# Patient Record
Sex: FEMALE | NOT HISPANIC OR LATINO | Employment: OTHER | URBAN - METROPOLITAN AREA
[De-identification: names, ages, dates, MRNs, and addresses within clinical notes are randomized per-mention and may not be internally consistent; named-entity substitution may affect disease eponyms.]

---

## 2017-01-25 ENCOUNTER — OFFICE VISIT (OUTPATIENT)
Dept: FAMILY MEDICINE CLINIC | Facility: CLINIC | Age: 67
End: 2017-01-25

## 2017-01-25 VITALS
BODY MASS INDEX: 28.53 KG/M2 | HEIGHT: 63 IN | HEART RATE: 91 BPM | OXYGEN SATURATION: 96 % | DIASTOLIC BLOOD PRESSURE: 70 MMHG | SYSTOLIC BLOOD PRESSURE: 120 MMHG | TEMPERATURE: 98.6 F | WEIGHT: 161 LBS

## 2017-01-25 DIAGNOSIS — R22.32 AXILLARY LUMP, LEFT: Primary | ICD-10-CM

## 2017-01-25 PROCEDURE — 99213 OFFICE O/P EST LOW 20 MIN: CPT | Performed by: NURSE PRACTITIONER

## 2017-01-25 RX ORDER — ESZOPICLONE 3 MG/1
TABLET, FILM COATED ORAL
Refills: 2 | COMMUNITY
Start: 2017-01-11 | End: 2019-01-17

## 2017-01-25 NOTE — MR AVS SNAPSHOT
Shell Sanford   2017 10:30 AM   Office Visit    Provider:  ORTEGA Rajput   Department:  [facility]   Dept Phone:  [phone]                Your Full Care Plan              Your Updated Medication List          This list is accurate as of: 17 11:16 AM.  Always use your most recent med list.                ALPRAZolam 0.5 MG tablet   Commonly known as:  XANAX       ARIPiprazole 2 MG tablet   Commonly known as:  ABILIFY       atorvastatin 20 MG tablet   Commonly known as:  LIPITOR   Take 1 tablet by mouth Daily.       diclofenac 1 % gel gel   Commonly known as:  VOLTAREN   Apply 4 g topically 4 (four) times a day.       eszopiclone 3 MG tablet   Commonly known as:  LUNESTA       fenofibrate micronized 130 MG capsule   Commonly known as:  ANTARA   Take 1 capsule by mouth Every Morning Before Breakfast.       lamoTRIgine 200 MG tablet   Commonly known as:  LaMICtal       levothyroxine 100 MCG tablet   Commonly known as:  SYNTHROID, LEVOTHROID   Take 1 tablet by mouth Daily.       valsartan-hydrochlorothiazide 320-25 MG per tablet   Commonly known as:  DIOVAN-HCT   Take 1 tablet by mouth Daily.       venlafaxine XR 75 MG 24 hr capsule   Commonly known as:  EFFEXOR-XR       zolpidem CR 12.5 MG CR tablet   Commonly known as:  AMBIEN CR               Instructions     None    Patient Instructions History      [employer] Signup     [facility] [employer] allows you to send messages to your doctor, view your test results, renew your prescriptions, schedule appointments, and more. To sign up, go to [employer] and click on the Sign Up Now link in the New User? box. Enter your [employer] Activation Code exactly as it appears below along with the last four digits of your Social Security Number and your Date of Birth () to complete the sign-up process. If you do not sign up before the expiration date, you must request a new  "code.    [email or web address] Activation Code: [email or web address]  Expires: 2/8/2017 11:16 AM    If you have questions, you can email [email or web address] or call [phone] to talk to our [employer] staff. Remember, [employer] is NOT to be used for urgent needs. For medical emergencies, dial 911.               Other Info from Your Visit           Allergies     Keflet [Cephalexin]      Neosporin [Neomycin-polymyxin-gramicidin]      Penicillins        Reason for Visit     breast exam had a mastectomy      Vital Signs     Blood Pressure Pulse Temperature Height Weight Oxygen Saturation    120/70 91 98.6 °F (37 °C) 63\" (160 cm) 161 lb (73 kg) 96%    Body Mass Index Smoking Status                28.52 kg/m2 Current Some Day Smoker          "

## 2017-01-25 NOTE — PROGRESS NOTES
"Subjective   Shell Sanford is a 66 y.o. female.     History of Present Illness   Patient presents to office with reported swelling to left axillary area for several days. Denies tenderness or hard mass.  Describes it more as \"puffiness.\" Has history of breast cancer and had double mastectomy 5 years ago.  She has not had imaging done since. She reports she still has her axillary lymph nodes.  She contacted Dr. Ram's office and was instructed to f/u with her PCP first.    The following portions of the patient's history were reviewed and updated as appropriate: allergies, current medications, past family history, past medical history, past social history, past surgical history and problem list.    Review of Systems   Skin: Negative for color change and wound.       Objective   Physical Exam   Constitutional: She is oriented to person, place, and time. She appears well-developed and well-nourished.   HENT:   Head: Normocephalic and atraumatic.   Pulmonary/Chest: Effort normal.       Patient has 3 cm superficial fluctuant area to left axilla.  No tenderness elicited with palpation.  No lymphadenopathy noted.  No erythema.    Neurological: She is alert and oriented to person, place, and time.   Skin: Skin is warm and dry.   Psychiatric: She has a normal mood and affect. Judgment normal.   Vitals reviewed.      Assessment/Plan   Shell was seen today for breast exam.    Diagnoses and all orders for this visit:    Axillary lump, left  -     US Axilla Left      Question seroma.          "

## 2017-02-14 ENCOUNTER — HOSPITAL ENCOUNTER (OUTPATIENT)
Dept: ULTRASOUND IMAGING | Facility: HOSPITAL | Age: 67
Discharge: HOME | End: 2017-02-14
Admitting: NURSE PRACTITIONER

## 2017-02-14 PROCEDURE — 76882 US LMTD JT/FCL EVL NVASC XTR: CPT

## 2017-02-15 ENCOUNTER — TELEPHONE (OUTPATIENT)
Dept: FAMILY MEDICINE CLINIC | Facility: CLINIC | Age: 67
End: 2017-02-15

## 2017-02-15 NOTE — TELEPHONE ENCOUNTER
----- Message from ORTEGA Rajput sent at 2/14/2017  5:00 PM EST -----  Ultrasound was negative. No abnormality noted.

## 2017-02-23 DIAGNOSIS — N39.0 URINARY TRACT INFECTION, SITE UNSPECIFIED: Primary | ICD-10-CM

## 2017-02-23 RX ORDER — SULFAMETHOXAZOLE AND TRIMETHOPRIM 800; 160 MG/1; MG/1
1 TABLET ORAL 2 TIMES DAILY
Qty: 14 TABLET | Refills: 0 | Status: SHIPPED | OUTPATIENT
Start: 2017-02-23 | End: 2017-05-23

## 2017-04-15 DIAGNOSIS — E78.2 MIXED HYPERLIPIDEMIA: ICD-10-CM

## 2017-04-17 RX ORDER — ATORVASTATIN CALCIUM 20 MG/1
TABLET, FILM COATED ORAL
Qty: 30 TABLET | Refills: 0 | Status: SHIPPED | OUTPATIENT
Start: 2017-04-17 | End: 2017-05-14 | Stop reason: SDUPTHER

## 2017-05-14 DIAGNOSIS — I10 ESSENTIAL HYPERTENSION: ICD-10-CM

## 2017-05-14 DIAGNOSIS — E78.2 MIXED HYPERLIPIDEMIA: ICD-10-CM

## 2017-05-15 DIAGNOSIS — E03.9 ACQUIRED HYPOTHYROIDISM: ICD-10-CM

## 2017-05-15 DIAGNOSIS — E78.2 MIXED HYPERLIPIDEMIA: ICD-10-CM

## 2017-05-15 RX ORDER — ATORVASTATIN CALCIUM 20 MG/1
TABLET, FILM COATED ORAL
Qty: 30 TABLET | Refills: 0 | Status: SHIPPED | OUTPATIENT
Start: 2017-05-15 | End: 2017-05-23 | Stop reason: SDUPTHER

## 2017-05-15 RX ORDER — LEVOTHYROXINE SODIUM 100 MCG
TABLET ORAL
Qty: 30 TABLET | Refills: 5 | OUTPATIENT
Start: 2017-05-15

## 2017-05-15 RX ORDER — FENOFIBRATE 130 MG/1
CAPSULE ORAL
Qty: 30 CAPSULE | Refills: 5 | OUTPATIENT
Start: 2017-05-15

## 2017-05-15 RX ORDER — VALSARTAN AND HYDROCHLOROTHIAZIDE 320; 25 MG/1; MG/1
TABLET, FILM COATED ORAL
Qty: 30 TABLET | Refills: 5 | Status: SHIPPED | OUTPATIENT
Start: 2017-05-15 | End: 2017-05-23 | Stop reason: SDUPTHER

## 2017-05-23 ENCOUNTER — OFFICE VISIT (OUTPATIENT)
Dept: FAMILY MEDICINE CLINIC | Facility: CLINIC | Age: 67
End: 2017-05-23

## 2017-05-23 VITALS
HEART RATE: 84 BPM | HEIGHT: 63 IN | SYSTOLIC BLOOD PRESSURE: 141 MMHG | RESPIRATION RATE: 16 BRPM | TEMPERATURE: 98.5 F | BODY MASS INDEX: 28.35 KG/M2 | DIASTOLIC BLOOD PRESSURE: 78 MMHG | WEIGHT: 160 LBS

## 2017-05-23 DIAGNOSIS — I10 ESSENTIAL HYPERTENSION: ICD-10-CM

## 2017-05-23 DIAGNOSIS — E78.2 MIXED HYPERLIPIDEMIA: ICD-10-CM

## 2017-05-23 DIAGNOSIS — Z00.00 ANNUAL PHYSICAL EXAM: Primary | ICD-10-CM

## 2017-05-23 DIAGNOSIS — E03.9 ACQUIRED HYPOTHYROIDISM: ICD-10-CM

## 2017-05-23 LAB
ALBUMIN SERPL-MCNC: 4.7 G/DL (ref 3.5–5.2)
ALBUMIN/GLOB SERPL: 1.7 G/DL
ALP SERPL-CCNC: 75 U/L (ref 39–117)
ALT SERPL-CCNC: 26 U/L (ref 1–33)
AST SERPL-CCNC: 26 U/L (ref 1–32)
BASOPHILS # BLD AUTO: 0.03 10*3/MM3 (ref 0–0.2)
BASOPHILS NFR BLD AUTO: 0.4 % (ref 0–1.5)
BILIRUB SERPL-MCNC: 0.3 MG/DL (ref 0.1–1.2)
BUN SERPL-MCNC: 20 MG/DL (ref 8–23)
BUN/CREAT SERPL: 15.3 (ref 7–25)
CALCIUM SERPL-MCNC: 9.9 MG/DL (ref 8.6–10.5)
CHLORIDE SERPL-SCNC: 96 MMOL/L (ref 98–107)
CHOLEST SERPL-MCNC: 198 MG/DL (ref 0–200)
CO2 SERPL-SCNC: 27.9 MMOL/L (ref 22–29)
CREAT SERPL-MCNC: 1.31 MG/DL (ref 0.57–1)
EOSINOPHIL # BLD AUTO: 0.19 10*3/MM3 (ref 0–0.7)
EOSINOPHIL NFR BLD AUTO: 2.4 % (ref 0.3–6.2)
ERYTHROCYTE [DISTWIDTH] IN BLOOD BY AUTOMATED COUNT: 13.4 % (ref 11.7–13)
GLOBULIN SER CALC-MCNC: 2.8 GM/DL
GLUCOSE SERPL-MCNC: 97 MG/DL (ref 65–99)
HCT VFR BLD AUTO: 37.4 % (ref 35.6–45.5)
HDLC SERPL-MCNC: 61 MG/DL (ref 40–60)
HGB BLD-MCNC: 12 G/DL (ref 11.9–15.5)
IMM GRANULOCYTES # BLD: 0.02 10*3/MM3 (ref 0–0.03)
IMM GRANULOCYTES NFR BLD: 0.3 % (ref 0–0.5)
LDLC SERPL CALC-MCNC: 104 MG/DL (ref 0–100)
LYMPHOCYTES # BLD AUTO: 2.99 10*3/MM3 (ref 0.9–4.8)
LYMPHOCYTES NFR BLD AUTO: 38 % (ref 19.6–45.3)
MCH RBC QN AUTO: 29.3 PG (ref 26.9–32)
MCHC RBC AUTO-ENTMCNC: 32.1 G/DL (ref 32.4–36.3)
MCV RBC AUTO: 91.2 FL (ref 80.5–98.2)
MONOCYTES # BLD AUTO: 0.56 10*3/MM3 (ref 0.2–1.2)
MONOCYTES NFR BLD AUTO: 7.1 % (ref 5–12)
NEUTROPHILS # BLD AUTO: 4.08 10*3/MM3 (ref 1.9–8.1)
NEUTROPHILS NFR BLD AUTO: 51.8 % (ref 42.7–76)
PLATELET # BLD AUTO: 371 10*3/MM3 (ref 140–500)
POTASSIUM SERPL-SCNC: 4.3 MMOL/L (ref 3.5–5.2)
PROT SERPL-MCNC: 7.5 G/DL (ref 6–8.5)
RBC # BLD AUTO: 4.1 10*6/MM3 (ref 3.9–5.2)
SODIUM SERPL-SCNC: 138 MMOL/L (ref 136–145)
T4 FREE SERPL-MCNC: 1.61 NG/DL (ref 0.93–1.7)
TRIGL SERPL-MCNC: 164 MG/DL (ref 0–150)
TSH SERPL DL<=0.005 MIU/L-ACNC: 0.16 MIU/ML (ref 0.27–4.2)
VLDLC SERPL CALC-MCNC: 32.8 MG/DL (ref 5–40)
WBC # BLD AUTO: 7.87 10*3/MM3 (ref 4.5–10.7)

## 2017-05-23 PROCEDURE — G0438 PPPS, INITIAL VISIT: HCPCS | Performed by: FAMILY MEDICINE

## 2017-05-23 PROCEDURE — 99214 OFFICE O/P EST MOD 30 MIN: CPT | Performed by: FAMILY MEDICINE

## 2017-05-23 RX ORDER — LEVOTHYROXINE SODIUM 0.1 MG/1
100 TABLET ORAL DAILY
Qty: 30 TABLET | Refills: 5 | Status: SHIPPED | OUTPATIENT
Start: 2017-05-23 | End: 2017-11-16 | Stop reason: SDUPTHER

## 2017-05-23 RX ORDER — VALSARTAN AND HYDROCHLOROTHIAZIDE 320; 25 MG/1; MG/1
1 TABLET, FILM COATED ORAL DAILY
Qty: 30 TABLET | Refills: 5 | Status: SHIPPED | OUTPATIENT
Start: 2017-05-23 | End: 2017-12-22 | Stop reason: SDUPTHER

## 2017-05-23 RX ORDER — FENOFIBRATE 130 MG/1
130 CAPSULE ORAL
Qty: 30 CAPSULE | Refills: 5 | Status: SHIPPED | OUTPATIENT
Start: 2017-05-23 | End: 2017-11-16 | Stop reason: SDUPTHER

## 2017-05-23 RX ORDER — ATORVASTATIN CALCIUM 20 MG/1
20 TABLET, FILM COATED ORAL DAILY
Qty: 30 TABLET | Refills: 5 | Status: SHIPPED | OUTPATIENT
Start: 2017-05-23 | End: 2017-12-28 | Stop reason: SDUPTHER

## 2017-09-20 ENCOUNTER — PROCEDURE VISIT (OUTPATIENT)
Dept: FAMILY MEDICINE CLINIC | Facility: CLINIC | Age: 67
End: 2017-09-20

## 2017-09-20 VITALS
BODY MASS INDEX: 28.17 KG/M2 | SYSTOLIC BLOOD PRESSURE: 131 MMHG | DIASTOLIC BLOOD PRESSURE: 83 MMHG | HEART RATE: 93 BPM | HEIGHT: 63 IN | WEIGHT: 159 LBS | TEMPERATURE: 97.9 F | OXYGEN SATURATION: 97 % | RESPIRATION RATE: 18 BRPM

## 2017-09-20 DIAGNOSIS — Z12.72 VAGINAL PAP SMEAR: Primary | ICD-10-CM

## 2017-09-20 DIAGNOSIS — R35.0 URINARY FREQUENCY: ICD-10-CM

## 2017-09-20 LAB
BILIRUB BLD-MCNC: NEGATIVE MG/DL
CLARITY, POC: CLEAR
COLOR UR: YELLOW
GLUCOSE UR STRIP-MCNC: NEGATIVE MG/DL
KETONES UR QL: NEGATIVE
LEUKOCYTE EST, POC: NEGATIVE
NITRITE UR-MCNC: NEGATIVE MG/ML
PH UR: 6 [PH] (ref 5–8)
PROT UR STRIP-MCNC: NEGATIVE MG/DL
RBC # UR STRIP: NEGATIVE /UL
SP GR UR: 1.02 (ref 1–1.03)
UROBILINOGEN UR QL: NORMAL

## 2017-09-20 PROCEDURE — 81003 URINALYSIS AUTO W/O SCOPE: CPT | Performed by: NURSE PRACTITIONER

## 2017-09-20 PROCEDURE — G0101 CA SCREEN;PELVIC/BREAST EXAM: HCPCS | Performed by: NURSE PRACTITIONER

## 2017-09-20 RX ORDER — OXYBUTYNIN CHLORIDE 5 MG/1
5 TABLET ORAL 3 TIMES DAILY
Qty: 20 TABLET | Refills: 0 | Status: SHIPPED | OUTPATIENT
Start: 2017-09-20 | End: 2020-06-09

## 2017-09-20 NOTE — PROGRESS NOTES
Subjective   Shell Sanford is a 66 y.o. female.     History of Present Illness   Shell Sanford 66 y.o. female who presents for annual exam. The patient has no complaints today. Had hysterectomy 30 years ago. The patient is not taking hormone replacement therapy.  She reports having additional complaints.   Patient is seeing specialist next week about reconstructive surgery.      Last colonoscopy was last year.   History of abnormal mammogram: yes - history of breast cancer with bilateral mastectomy per Dr. Ram.   Patient is unsure of last DEXA scan.        Patient had UTI 2 weeks ago and was given Bactrim.  She finished all of antibiotic.  She states symptoms started back shortly after.   Reports frequency and suprapubic pressure.    The following portions of the patient's history were reviewed and updated as appropriate: allergies, current medications, past family history, past medical history, past social history, past surgical history and problem list.    Review of Systems   Genitourinary: Positive for frequency, pelvic pain and urgency. Negative for dysuria and flank pain.   Musculoskeletal: Negative for back pain.       Objective   Physical Exam   Constitutional: She is oriented to person, place, and time. She appears well-developed and well-nourished.   HENT:   Head: Normocephalic and atraumatic.   Pulmonary/Chest: Effort normal.   Genitourinary: Vagina normal. Pelvic exam was performed with patient supine. There is no rash, tenderness, lesion or injury on the right labia. There is no rash, tenderness, lesion or injury on the left labia.   Neurological: She is alert and oriented to person, place, and time.   Skin: Skin is warm and dry.   Psychiatric: She has a normal mood and affect. Judgment normal.   Nursing note and vitals reviewed.      Assessment/Plan   Shell was seen today for gynecologic exam.    Diagnoses and all orders for this visit:    Vaginal Pap smear  -     Pap IG, HPV-hr    Urinary  frequency  -     Cancel: Urinalysis (clean catch)  -     POCT urinalysis dipstick, automated  -     oxybutynin (DITROPAN) 5 MG tablet; Take 1 tablet by mouth 3 (Three) Times a Day.

## 2017-09-26 LAB
CYTOLOGIST CVX/VAG CYTO: NORMAL
CYTOLOGY CVX/VAG DOC THIN PREP: NORMAL
DX ICD CODE: NORMAL
HIV 1 & 2 AB SER-IMP: NORMAL
HPV I/H RISK 1 DNA CVX QL PROBE+SIG AMP: NORMAL
OTHER STN SPEC: NORMAL
PATH REPORT.FINAL DX SPEC: NORMAL
REQUEST PROBLEM: NORMAL
STAT OF ADQ CVX/VAG CYTO-IMP: NORMAL

## 2017-11-14 DIAGNOSIS — E03.9 ACQUIRED HYPOTHYROIDISM: ICD-10-CM

## 2017-11-14 DIAGNOSIS — E78.2 MIXED HYPERLIPIDEMIA: ICD-10-CM

## 2017-11-14 RX ORDER — LEVOTHYROXINE SODIUM 0.1 MG/1
TABLET ORAL
Qty: 30 TABLET | Refills: 0 | OUTPATIENT
Start: 2017-11-14

## 2017-11-14 RX ORDER — FENOFIBRATE 130 MG/1
CAPSULE ORAL
Qty: 30 CAPSULE | Refills: 0 | OUTPATIENT
Start: 2017-11-14

## 2017-11-16 DIAGNOSIS — E78.2 MIXED HYPERLIPIDEMIA: ICD-10-CM

## 2017-11-16 DIAGNOSIS — E03.9 ACQUIRED HYPOTHYROIDISM: ICD-10-CM

## 2017-11-17 RX ORDER — FENOFIBRATE 130 MG/1
CAPSULE ORAL
Qty: 30 CAPSULE | Refills: 0 | Status: SHIPPED | OUTPATIENT
Start: 2017-11-17 | End: 2017-12-22 | Stop reason: SDUPTHER

## 2017-11-17 RX ORDER — LEVOTHYROXINE SODIUM 0.1 MG/1
TABLET ORAL
Qty: 30 TABLET | Refills: 0 | Status: SHIPPED | OUTPATIENT
Start: 2017-11-17 | End: 2017-12-22 | Stop reason: SDUPTHER

## 2017-12-12 DIAGNOSIS — E03.9 ACQUIRED HYPOTHYROIDISM: ICD-10-CM

## 2017-12-12 DIAGNOSIS — E78.2 MIXED HYPERLIPIDEMIA: ICD-10-CM

## 2017-12-12 RX ORDER — ATORVASTATIN CALCIUM 20 MG/1
TABLET, FILM COATED ORAL
Qty: 30 TABLET | Refills: 0 | OUTPATIENT
Start: 2017-12-12

## 2017-12-12 RX ORDER — LEVOTHYROXINE SODIUM 0.1 MG/1
TABLET ORAL
Qty: 30 TABLET | Refills: 0 | OUTPATIENT
Start: 2017-12-12

## 2017-12-12 RX ORDER — FENOFIBRATE 130 MG/1
CAPSULE ORAL
Qty: 30 CAPSULE | Refills: 0 | OUTPATIENT
Start: 2017-12-12

## 2017-12-22 DIAGNOSIS — I10 ESSENTIAL HYPERTENSION: ICD-10-CM

## 2017-12-22 DIAGNOSIS — E78.2 MIXED HYPERLIPIDEMIA: ICD-10-CM

## 2017-12-22 DIAGNOSIS — E03.9 ACQUIRED HYPOTHYROIDISM: ICD-10-CM

## 2017-12-22 RX ORDER — FENOFIBRATE 130 MG/1
CAPSULE ORAL
Qty: 30 CAPSULE | Refills: 0 | Status: SHIPPED | OUTPATIENT
Start: 2017-12-22 | End: 2017-12-28 | Stop reason: SDUPTHER

## 2017-12-22 RX ORDER — LEVOTHYROXINE SODIUM 0.1 MG/1
TABLET ORAL
Qty: 30 TABLET | Refills: 0 | Status: SHIPPED | OUTPATIENT
Start: 2017-12-22 | End: 2017-12-28 | Stop reason: SDUPTHER

## 2017-12-22 RX ORDER — VALSARTAN AND HYDROCHLOROTHIAZIDE 320; 25 MG/1; MG/1
1 TABLET, FILM COATED ORAL DAILY
Qty: 30 TABLET | Refills: 0 | Status: SHIPPED | OUTPATIENT
Start: 2017-12-22 | End: 2017-12-28 | Stop reason: SDUPTHER

## 2017-12-22 NOTE — TELEPHONE ENCOUNTER
Patient calling for lab order for upcoming appt; please send over so she can have these done before appt on Dec. 28th

## 2017-12-26 DIAGNOSIS — E03.9 HYPOTHYROIDISM, UNSPECIFIED TYPE: ICD-10-CM

## 2017-12-26 DIAGNOSIS — G47.00 INSOMNIA, UNSPECIFIED TYPE: ICD-10-CM

## 2017-12-26 DIAGNOSIS — I10 HYPERTENSION, UNSPECIFIED TYPE: ICD-10-CM

## 2017-12-26 DIAGNOSIS — E78.5 HYPERLIPIDEMIA, UNSPECIFIED HYPERLIPIDEMIA TYPE: Primary | ICD-10-CM

## 2017-12-27 ENCOUNTER — RESULTS ENCOUNTER (OUTPATIENT)
Dept: FAMILY MEDICINE CLINIC | Facility: CLINIC | Age: 67
End: 2017-12-27

## 2017-12-27 DIAGNOSIS — G47.00 INSOMNIA, UNSPECIFIED TYPE: ICD-10-CM

## 2017-12-27 DIAGNOSIS — E03.9 HYPOTHYROIDISM, UNSPECIFIED TYPE: ICD-10-CM

## 2017-12-27 DIAGNOSIS — E78.5 HYPERLIPIDEMIA, UNSPECIFIED HYPERLIPIDEMIA TYPE: ICD-10-CM

## 2017-12-27 DIAGNOSIS — I10 HYPERTENSION, UNSPECIFIED TYPE: ICD-10-CM

## 2017-12-28 ENCOUNTER — OFFICE VISIT (OUTPATIENT)
Dept: FAMILY MEDICINE CLINIC | Facility: CLINIC | Age: 67
End: 2017-12-28

## 2017-12-28 VITALS
WEIGHT: 160 LBS | RESPIRATION RATE: 18 BRPM | SYSTOLIC BLOOD PRESSURE: 141 MMHG | DIASTOLIC BLOOD PRESSURE: 83 MMHG | BODY MASS INDEX: 28.35 KG/M2 | HEIGHT: 63 IN | TEMPERATURE: 98 F | HEART RATE: 92 BPM

## 2017-12-28 DIAGNOSIS — R73.01 IFG (IMPAIRED FASTING GLUCOSE): Primary | ICD-10-CM

## 2017-12-28 DIAGNOSIS — I10 ESSENTIAL HYPERTENSION: ICD-10-CM

## 2017-12-28 DIAGNOSIS — E03.9 ACQUIRED HYPOTHYROIDISM: ICD-10-CM

## 2017-12-28 DIAGNOSIS — E78.2 MIXED HYPERLIPIDEMIA: ICD-10-CM

## 2017-12-28 PROCEDURE — 99214 OFFICE O/P EST MOD 30 MIN: CPT | Performed by: FAMILY MEDICINE

## 2017-12-28 RX ORDER — LEVOTHYROXINE SODIUM 0.1 MG/1
100 TABLET ORAL DAILY
Qty: 30 TABLET | Refills: 5 | Status: SHIPPED | OUTPATIENT
Start: 2017-12-28 | End: 2018-07-16 | Stop reason: SDUPTHER

## 2017-12-28 RX ORDER — VALSARTAN AND HYDROCHLOROTHIAZIDE 320; 25 MG/1; MG/1
1 TABLET, FILM COATED ORAL DAILY
Qty: 30 TABLET | Refills: 5 | Status: SHIPPED | OUTPATIENT
Start: 2017-12-28 | End: 2018-07-16 | Stop reason: SDUPTHER

## 2017-12-28 RX ORDER — FENOFIBRATE 130 MG/1
130 CAPSULE ORAL
Qty: 30 CAPSULE | Refills: 5 | Status: SHIPPED | OUTPATIENT
Start: 2017-12-28 | End: 2018-07-16 | Stop reason: SDUPTHER

## 2017-12-28 RX ORDER — CLOBETASOL PROPIONATE 0.5 MG/G
CREAM TOPICAL EVERY 12 HOURS SCHEDULED
Qty: 15 G | Refills: 1 | Status: SHIPPED | OUTPATIENT
Start: 2017-12-28 | End: 2020-06-09

## 2017-12-28 RX ORDER — ATORVASTATIN CALCIUM 20 MG/1
20 TABLET, FILM COATED ORAL DAILY
Qty: 30 TABLET | Refills: 5 | Status: SHIPPED | OUTPATIENT
Start: 2017-12-28 | End: 2018-06-13 | Stop reason: SDUPTHER

## 2017-12-28 NOTE — PROGRESS NOTES
Subjective   Shell Sanford is a 67 y.o. female.     History of Present Illness     Chief Complaint:   Chief Complaint   Patient presents with   • Hypertension     med refill    • Hyperlipidemia   • Hypothyroidism   • Depression       Shell Sanford 67 y.o. female who presents today for Medical Management of the below listed issues and medication refills.  she has a problem list of   Patient Active Problem List   Diagnosis   • Essential hypertension   • Hyperlipidemia   • Acquired hypothyroidism   • IFG (impaired fasting glucose)   .  Since the last visit, she has overall felt well.  she has been compliant with   Current Outpatient Prescriptions:   •  atorvastatin (LIPITOR) 20 MG tablet, Take 1 tablet by mouth Daily., Disp: 30 tablet, Rfl: 5  •  fenofibrate micronized (ANTARA) 130 MG capsule, Take 1 capsule by mouth Every Morning Before Breakfast., Disp: 30 capsule, Rfl: 5  •  levothyroxine (SYNTHROID, LEVOTHROID) 100 MCG tablet, Take 1 tablet by mouth Daily., Disp: 30 tablet, Rfl: 5  •  valsartan-hydrochlorothiazide (DIOVAN-HCT) 320-25 MG per tablet, Take 1 tablet by mouth Daily., Disp: 30 tablet, Rfl: 5  •  ALPRAZolam (XANAX) 0.5 MG tablet, , Disp: , Rfl:   •  ARIPiprazole (ABILIFY) 2 MG tablet, , Disp: , Rfl:   •  clobetasol (TEMOVATE) 0.05 % cream, Apply  topically Every 12 (Twelve) Hours., Disp: 15 g, Rfl: 1  •  diclofenac (VOLTAREN) 1 % gel gel, Apply 4 g topically 4 (four) times a day., Disp: 5 tube, Rfl: 5  •  eszopiclone (LUNESTA) 3 MG tablet, , Disp: , Rfl: 2  •  lamoTRIgine (LaMICtal) 200 MG tablet, , Disp: , Rfl:   •  oxybutynin (DITROPAN) 5 MG tablet, Take 1 tablet by mouth 3 (Three) Times a Day., Disp: 20 tablet, Rfl: 0  •  venlafaxine XR (EFFEXOR-XR) 75 MG 24 hr capsule, , Disp: , Rfl:   •  zolpidem CR (AMBIEN CR) 12.5 MG CR tablet, , Disp: , Rfl: .  she denies medication side effects.    All of the chronic condition(s) listed above are stable w/o issues.    /83  Pulse 92  Temp 98 °F (36.7 °C)  "(Oral)   Resp 18  Ht 160 cm (63\")  Wt 72.6 kg (160 lb)  BMI 28.34 kg/m2    Results for orders placed or performed in visit on 09/20/17   Request Problem   Result Value Ref Range    Request Problem CANCELED    POCT urinalysis dipstick, automated   Result Value Ref Range    Color Yellow Yellow, Straw, Dark Yellow, Ashley    Clarity, UA Clear Clear    Glucose, UA Negative Negative, 1000 mg/dL (3+) mg/dL    Bilirubin Negative Negative    Ketones, UA Negative Negative    Specific Gravity  1.020 1.005 - 1.030    Blood, UA Negative Negative    pH, Urine 6.0 5.0 - 8.0    Protein, POC Negative Negative mg/dL    Urobilinogen, UA Normal Normal    Leukocytes Negative Negative    Nitrite, UA Negative Negative   Pap IG, HPV-hr   Result Value Ref Range    Diagnosis Comment     Specimen adequacy: Comment     Clinician Provided ICD-10: Comment     Performed by: Comment     . .     Note: Comment     Method: Comment     HPV, high-risk CANCELED            The following portions of the patient's history were reviewed and updated as appropriate: allergies, current medications, past family history, past medical history, past social history, past surgical history and problem list.    Review of Systems   Constitutional: Negative for activity change, chills, fatigue and fever.   Respiratory: Negative for cough and chest tightness.    Cardiovascular: Negative for chest pain and palpitations.   Gastrointestinal: Negative for abdominal pain and nausea.   Endocrine: Negative for cold intolerance.   Psychiatric/Behavioral: Negative for behavioral problems and dysphoric mood.       Objective   Physical Exam   Constitutional: She appears well-developed and well-nourished.   Neck: Neck supple. No thyromegaly present.   Cardiovascular: Normal rate and regular rhythm.    No murmur heard.  Pulmonary/Chest: Effort normal and breath sounds normal.   Abdominal: Bowel sounds are normal.   Psychiatric: She has a normal mood and affect. Her behavior is " normal.   Nursing note and vitals reviewed.  Labs reviewed with pt today during visit. All questions answered.      Assessment/Plan   Shell was seen today for hypertension, hyperlipidemia, hypothyroidism and depression.    Diagnoses and all orders for this visit:    IFG (impaired fasting glucose)    Essential hypertension  -     valsartan-hydrochlorothiazide (DIOVAN-HCT) 320-25 MG per tablet; Take 1 tablet by mouth Daily.    Acquired hypothyroidism  -     levothyroxine (SYNTHROID, LEVOTHROID) 100 MCG tablet; Take 1 tablet by mouth Daily.    Mixed hyperlipidemia  -     fenofibrate micronized (ANTARA) 130 MG capsule; Take 1 capsule by mouth Every Morning Before Breakfast.  -     atorvastatin (LIPITOR) 20 MG tablet; Take 1 tablet by mouth Daily.    Other orders  -     clobetasol (TEMOVATE) 0.05 % cream; Apply  topically Every 12 (Twelve) Hours.    Diet/exercise/weight management to rx the glucose.

## 2018-05-09 ENCOUNTER — OFFICE VISIT (OUTPATIENT)
Dept: FAMILY MEDICINE CLINIC | Facility: CLINIC | Age: 68
End: 2018-05-09

## 2018-05-09 VITALS
OXYGEN SATURATION: 97 % | HEART RATE: 93 BPM | SYSTOLIC BLOOD PRESSURE: 134 MMHG | BODY MASS INDEX: 28.26 KG/M2 | HEIGHT: 63 IN | WEIGHT: 159.5 LBS | TEMPERATURE: 98.2 F | DIASTOLIC BLOOD PRESSURE: 81 MMHG | RESPIRATION RATE: 18 BRPM

## 2018-05-09 DIAGNOSIS — N76.0 ACUTE VAGINITIS: Primary | ICD-10-CM

## 2018-05-09 DIAGNOSIS — R07.89 RIGHT-SIDED CHEST WALL PAIN: ICD-10-CM

## 2018-05-09 PROCEDURE — 99213 OFFICE O/P EST LOW 20 MIN: CPT | Performed by: NURSE PRACTITIONER

## 2018-05-09 RX ORDER — FLUCONAZOLE 150 MG/1
150 TABLET ORAL ONCE
Qty: 1 TABLET | Refills: 2 | Status: SHIPPED | OUTPATIENT
Start: 2018-05-09 | End: 2018-05-09

## 2018-05-09 NOTE — PROGRESS NOTES
Subjective   Shell Sanford is a 67 y.o. female.     History of Present Illness   Shell Sanford female 67 y.o. who presents for evaluation of vaginal symptoms of burning and itching. Symptoms have been present for several days. Symptoms include local irritation.  Prior therapies tried: none.  Denies vaginal discharge.     Patient also reports some left breast/chest wall tenderness.  She states it is sore beneath her right breast.  She stopped wearing under wire bra a few days ago and states it has improved.  She wanted to still have area looked at due to hx of breast cancer and bilateral mastectomy.     The following portions of the patient's history were reviewed and updated as appropriate: allergies, current medications, past family history, past medical history, past social history, past surgical history and problem list.    Review of Systems   Constitutional: Negative for chills and fever.   Genitourinary: Positive for vaginal pain. Negative for decreased urine volume, difficulty urinating, dysuria, enuresis, flank pain, frequency, genital sores, hematuria, menstrual problem, pelvic pain, urgency, vaginal bleeding and vaginal discharge.       Objective   Physical Exam   Constitutional: She is oriented to person, place, and time. She appears well-developed and well-nourished.   Pulmonary/Chest: Effort normal. She exhibits tenderness. She exhibits no mass, no laceration, no crepitus, no edema, no deformity, no swelling and no retraction. Right breast exhibits no mass. Left breast exhibits no mass.       Genitourinary: There is no rash or tenderness on the right labia. There is no rash or tenderness on the left labia.   Genitourinary Comments: Erythema to both labia majora.    Neurological: She is alert and oriented to person, place, and time.   Psychiatric: She has a normal mood and affect. Judgment normal.   Nursing note and vitals reviewed.      Assessment/Plan   Shell was seen today for rash and breast  pain.    Diagnoses and all orders for this visit:    Acute vaginitis  -     fluconazole (DIFLUCAN) 150 MG tablet; Take 1 tablet by mouth 1 (One) Time for 1 dose. One PO X 1 dose for yeast infection    Right-sided chest wall pain  Comments:  improved

## 2018-05-18 ENCOUNTER — TELEPHONE (OUTPATIENT)
Dept: FAMILY MEDICINE CLINIC | Facility: CLINIC | Age: 68
End: 2018-05-18

## 2018-05-18 NOTE — TELEPHONE ENCOUNTER
Patient states she seen you last week for a vaginal infection. It has improved some but still having symptoms

## 2018-06-13 DIAGNOSIS — E78.2 MIXED HYPERLIPIDEMIA: ICD-10-CM

## 2018-06-13 RX ORDER — ATORVASTATIN CALCIUM 20 MG/1
TABLET, FILM COATED ORAL
Qty: 30 TABLET | Refills: 0 | Status: SHIPPED | OUTPATIENT
Start: 2018-06-13 | End: 2018-07-16 | Stop reason: SDUPTHER

## 2018-07-17 ENCOUNTER — OFFICE VISIT (OUTPATIENT)
Dept: FAMILY MEDICINE CLINIC | Facility: CLINIC | Age: 68
End: 2018-07-17

## 2018-07-17 VITALS
RESPIRATION RATE: 18 BRPM | BODY MASS INDEX: 27.82 KG/M2 | TEMPERATURE: 98.1 F | DIASTOLIC BLOOD PRESSURE: 83 MMHG | HEART RATE: 90 BPM | WEIGHT: 157 LBS | SYSTOLIC BLOOD PRESSURE: 126 MMHG | HEIGHT: 63 IN

## 2018-07-17 DIAGNOSIS — E03.9 ACQUIRED HYPOTHYROIDISM: ICD-10-CM

## 2018-07-17 DIAGNOSIS — R73.01 IFG (IMPAIRED FASTING GLUCOSE): ICD-10-CM

## 2018-07-17 DIAGNOSIS — I10 ESSENTIAL HYPERTENSION: ICD-10-CM

## 2018-07-17 DIAGNOSIS — E78.2 MIXED HYPERLIPIDEMIA: ICD-10-CM

## 2018-07-17 DIAGNOSIS — Z00.00 MEDICARE ANNUAL WELLNESS VISIT, SUBSEQUENT: Primary | ICD-10-CM

## 2018-07-17 PROCEDURE — 99214 OFFICE O/P EST MOD 30 MIN: CPT | Performed by: FAMILY MEDICINE

## 2018-07-17 PROCEDURE — G0439 PPPS, SUBSEQ VISIT: HCPCS | Performed by: FAMILY MEDICINE

## 2018-07-17 RX ORDER — FENOFIBRATE 130 MG/1
130 CAPSULE ORAL
Qty: 30 CAPSULE | Refills: 5 | Status: SHIPPED | OUTPATIENT
Start: 2018-07-17 | End: 2019-01-02 | Stop reason: SDUPTHER

## 2018-07-17 RX ORDER — ATORVASTATIN CALCIUM 20 MG/1
20 TABLET, FILM COATED ORAL DAILY
Qty: 30 TABLET | Refills: 5 | Status: SHIPPED | OUTPATIENT
Start: 2018-07-17 | End: 2019-01-02 | Stop reason: SDUPTHER

## 2018-07-17 RX ORDER — LEVOTHYROXINE SODIUM 0.1 MG/1
100 TABLET ORAL DAILY
Qty: 30 TABLET | Refills: 5 | Status: SHIPPED | OUTPATIENT
Start: 2018-07-17 | End: 2019-01-02 | Stop reason: SDUPTHER

## 2018-07-17 RX ORDER — VALSARTAN AND HYDROCHLOROTHIAZIDE 320; 25 MG/1; MG/1
1 TABLET, FILM COATED ORAL DAILY
Qty: 30 TABLET | Refills: 5 | Status: SHIPPED | OUTPATIENT
Start: 2018-07-17 | End: 2018-08-20 | Stop reason: RX

## 2018-07-17 NOTE — PROGRESS NOTES
QUICK REFERENCE INFORMATION:  The ABCs of the Annual Wellness Visit    Subsequent Medicare Wellness Visit    HEALTH RISK ASSESSMENT    1950    Recent Hospitalizations:  No hospitalization(s) within the last year..        Current Medical Providers:  Patient Care Team:  Bryce Soto MD as PCP - General  Bryce Soto MD as PCP - Family Medicine  Avi Rivas III, MD as PCP - Claims Attributed        Smoking Status:  History   Smoking Status   • Current Some Day Smoker   Smokeless Tobacco   • Never Used       Alcohol Consumption:  History   Alcohol Use No       Depression Screen:   PHQ-2/PHQ-9 Depression Screening 7/17/2018   Little interest or pleasure in doing things 0   Feeling down, depressed, or hopeless 0   Trouble falling or staying asleep, or sleeping too much 3   Feeling tired or having little energy 1   Poor appetite or overeating 0   Feeling bad about yourself - or that you are a failure or have let yourself or your family down 0   Trouble concentrating on things, such as reading the newspaper or watching television 0   Moving or speaking so slowly that other people could have noticed. Or the opposite - being so fidgety or restless that you have been moving around a lot more than usual 0   Thoughts that you would be better off dead, or of hurting yourself in some way 0   Total Score 4       Health Habits and Functional and Cognitive Screening:  Functional & Cognitive Status 7/17/2018   Do you have difficulty preparing food and eating? No   Do you have difficulty bathing yourself, getting dressed or grooming yourself? No   Do you have difficulty using the toilet? No   Do you have difficulty moving around from place to place? No   Do you have trouble with steps or getting out of a bed or a chair? No   In the past year have you fallen or experienced a near fall? No   Current Diet Well Balanced Diet   Dental Exam Up to date   Eye Exam Up to date   Exercise (times per week) 3 times per week    Current Exercise Activities Include Walking   Do you need help using the phone?  No   Are you deaf or do you have serious difficulty hearing?  No   Do you need help with transportation? No   Do you need help shopping? No   Do you need help preparing meals?  No   Do you need help with housework?  No   Do you need help with laundry? No   Do you need help taking your medications? No   Do you need help managing money? No   Do you ever drive or ride in a car without wearing a seat belt? No   Have you felt unusual stress, anger or loneliness in the last month? No   Who do you live with? Spouse   If you need help, do you have trouble finding someone available to you? Yes   Have you been bothered in the last four weeks by sexual problems? No   Do you have difficulty concentrating, remembering or making decisions? No           Does the patient have evidence of cognitive impairment? No    Aspirin use counseling: Taking ASA appropriately as indicated      Recent Lab Results:  CMP:  Lab Results   Component Value Date    GLU 97 05/23/2017    BUN 20 05/23/2017    CREATININE 1.31 (H) 05/23/2017    EGFRIFNONA 40 (L) 05/23/2017    EGFRIFAFRI 49 (L) 05/23/2017    BCR 15.3 05/23/2017     05/23/2017    K 4.3 05/23/2017    CO2 27.9 05/23/2017    CALCIUM 9.9 05/23/2017    PROTENTOTREF 7.5 05/23/2017    ALBUMIN 4.70 05/23/2017    LABGLOBREF 2.8 05/23/2017    LABIL2 1.7 05/23/2017    BILITOT 0.3 05/23/2017    ALKPHOS 75 05/23/2017    AST 26 05/23/2017    ALT 26 05/23/2017     Lipid Panel:  Lab Results   Component Value Date    TRIG 164 (H) 05/23/2017    HDL 61 (H) 05/23/2017    VLDL 32.8 05/23/2017    LDLHDL 1.61 10/20/2016     HbA1c:       Visual Acuity:  No exam data present    Age-appropriate Screening Schedule:  Refer to the list below for future screening recommendations based on patient's age, sex and/or medical conditions. Orders for these recommended tests are listed in the plan section. The patient has been provided with a  written plan.    Health Maintenance   Topic Date Due   • LIPID PANEL  05/23/2018   • INFLUENZA VACCINE  08/01/2018   • PAP SMEAR  09/20/2018   • TDAP/TD VACCINES (2 - Td) 01/01/2023   • COLONOSCOPY  11/23/2025   • PNEUMOCOCCAL VACCINES (65+ LOW/MEDIUM RISK)  Completed   • ZOSTER VACCINE  Excluded        Subjective   History of Present Illness    Shell Sanford is a 67 y.o. female who presents for an Subsequent Wellness Visit.    The following portions of the patient's history were reviewed and updated as appropriate: allergies, current medications, past family history, past medical history, past social history, past surgical history and problem list.    Outpatient Medications Prior to Visit   Medication Sig Dispense Refill   • ALPRAZolam (XANAX) 0.5 MG tablet      • ARIPiprazole (ABILIFY) 2 MG tablet      • clobetasol (TEMOVATE) 0.05 % cream Apply  topically Every 12 (Twelve) Hours. 15 g 1   • diclofenac (VOLTAREN) 1 % gel gel Apply 4 g topically 4 (four) times a day. 5 tube 5   • eszopiclone (LUNESTA) 3 MG tablet   2   • lamoTRIgine (LaMICtal) 200 MG tablet      • oxybutynin (DITROPAN) 5 MG tablet Take 1 tablet by mouth 3 (Three) Times a Day. 20 tablet 0   • venlafaxine XR (EFFEXOR-XR) 75 MG 24 hr capsule      • zolpidem CR (AMBIEN CR) 12.5 MG CR tablet      • atorvastatin (LIPITOR) 20 MG tablet TAKE ONE TABLET BY MOUTH EVERY DAY 30 tablet 0   • fenofibrate micronized (ANTARA) 130 MG capsule Take 1 capsule by mouth Every Morning Before Breakfast. 30 capsule 5   • levothyroxine (SYNTHROID, LEVOTHROID) 100 MCG tablet Take 1 tablet by mouth Daily. 30 tablet 5   • valsartan-hydrochlorothiazide (DIOVAN-HCT) 320-25 MG per tablet Take 1 tablet by mouth Daily. 30 tablet 5     No facility-administered medications prior to visit.        Patient Active Problem List   Diagnosis   • Essential hypertension   • Hyperlipidemia   • Acquired hypothyroidism   • IFG (impaired fasting glucose)       Advance Care Planning:  has an  "advance directive - a copy HAS NOT been provided    Identification of Risk Factors:  Risk factors include: cardiovascular risk.    Review of Systems    Compared to one year ago, the patient feels her physical health is the same.  Compared to one year ago, the patient feels her mental health is the same.    Objective     Physical Exam    Vitals:    07/17/18 0953   BP: 126/83   Pulse: 90   Resp: 18   Temp: 98.1 °F (36.7 °C)   TempSrc: Oral   Weight: 71.2 kg (157 lb)   Height: 160 cm (63\")   PainSc: 0-No pain       Patient's Body mass index is 27.81 kg/m². BMI is within normal parameters. No follow-up required.      Assessment/Plan   Patient Self-Management and Personalized Health Advice  The patient has been provided with information about: diet, exercise and weight management and preventive services including:   · Exercise counseling provided, Fall Risk assessment done, Nutrition counseling provided.    Visit Diagnoses:    ICD-10-CM ICD-9-CM   1. Medicare annual wellness visit, subsequent Z00.00 V70.0   2. Acquired hypothyroidism E03.9 244.9   3. Essential hypertension I10 401.9   4. Mixed hyperlipidemia E78.2 272.2   5. IFG (impaired fasting glucose) R73.01 790.21       Orders Placed This Encounter   Procedures   • TSH     Standing Status:   Future   • T4, Free     Standing Status:   Future   • Hemoglobin A1c     Standing Status:   Future   • Basic Metabolic Panel     Standing Status:   Future   • Lipid Panel     Standing Status:   Future       Outpatient Encounter Prescriptions as of 7/17/2018   Medication Sig Dispense Refill   • ALPRAZolam (XANAX) 0.5 MG tablet      • ARIPiprazole (ABILIFY) 2 MG tablet      • atorvastatin (LIPITOR) 20 MG tablet Take 1 tablet by mouth Daily. 30 tablet 5   • clobetasol (TEMOVATE) 0.05 % cream Apply  topically Every 12 (Twelve) Hours. 15 g 1   • diclofenac (VOLTAREN) 1 % gel gel Apply 4 g topically 4 (four) times a day. 5 tube 5   • eszopiclone (LUNESTA) 3 MG tablet   2   • " fenofibrate micronized (ANTARA) 130 MG capsule Take 1 capsule by mouth Every Morning Before Breakfast. 30 capsule 5   • lamoTRIgine (LaMICtal) 200 MG tablet      • levothyroxine (SYNTHROID, LEVOTHROID) 100 MCG tablet Take 1 tablet by mouth Daily. 30 tablet 5   • oxybutynin (DITROPAN) 5 MG tablet Take 1 tablet by mouth 3 (Three) Times a Day. 20 tablet 0   • valsartan-hydrochlorothiazide (DIOVAN-HCT) 320-25 MG per tablet Take 1 tablet by mouth Daily. 30 tablet 5   • venlafaxine XR (EFFEXOR-XR) 75 MG 24 hr capsule      • zolpidem CR (AMBIEN CR) 12.5 MG CR tablet      • [DISCONTINUED] atorvastatin (LIPITOR) 20 MG tablet TAKE ONE TABLET BY MOUTH EVERY DAY 30 tablet 0   • [DISCONTINUED] fenofibrate micronized (ANTARA) 130 MG capsule Take 1 capsule by mouth Every Morning Before Breakfast. 30 capsule 5   • [DISCONTINUED] levothyroxine (SYNTHROID, LEVOTHROID) 100 MCG tablet Take 1 tablet by mouth Daily. 30 tablet 5   • [DISCONTINUED] valsartan-hydrochlorothiazide (DIOVAN-HCT) 320-25 MG per tablet Take 1 tablet by mouth Daily. 30 tablet 5     No facility-administered encounter medications on file as of 7/17/2018.        Reviewed use of high risk medication in the elderly: not applicable  Reviewed for potential of harmful drug interactions in the elderly: not applicable    Follow Up:  No Follow-up on file.     An After Visit Summary and PPPS with all of these plans were given to the patient.

## 2018-07-17 NOTE — PROGRESS NOTES
Subjective   Shell Sanford is a 67 y.o. female.     History of Present Illness     Chief Complaint:   Chief Complaint   Patient presents with   • medicare wellness     phq9 done today  -= med refill -   lab results    • Hypertension   • Hyperlipidemia   • Hypothyroidism       Shell Sanford 67 y.o. female who presents today for Medical Management of the below listed issues and medication refills.  she has a problem list of   Patient Active Problem List   Diagnosis   • Essential hypertension   • Hyperlipidemia   • Acquired hypothyroidism   • IFG (impaired fasting glucose)   .  Since the last visit, she has overall felt well.  she has been compliant with   Current Outpatient Prescriptions:   •  ALPRAZolam (XANAX) 0.5 MG tablet, , Disp: , Rfl:   •  ARIPiprazole (ABILIFY) 2 MG tablet, , Disp: , Rfl:   •  atorvastatin (LIPITOR) 20 MG tablet, Take 1 tablet by mouth Daily., Disp: 30 tablet, Rfl: 5  •  clobetasol (TEMOVATE) 0.05 % cream, Apply  topically Every 12 (Twelve) Hours., Disp: 15 g, Rfl: 1  •  diclofenac (VOLTAREN) 1 % gel gel, Apply 4 g topically 4 (four) times a day., Disp: 5 tube, Rfl: 5  •  eszopiclone (LUNESTA) 3 MG tablet, , Disp: , Rfl: 2  •  fenofibrate micronized (ANTARA) 130 MG capsule, Take 1 capsule by mouth Every Morning Before Breakfast., Disp: 30 capsule, Rfl: 5  •  lamoTRIgine (LaMICtal) 200 MG tablet, , Disp: , Rfl:   •  levothyroxine (SYNTHROID, LEVOTHROID) 100 MCG tablet, Take 1 tablet by mouth Daily., Disp: 30 tablet, Rfl: 5  •  oxybutynin (DITROPAN) 5 MG tablet, Take 1 tablet by mouth 3 (Three) Times a Day., Disp: 20 tablet, Rfl: 0  •  valsartan-hydrochlorothiazide (DIOVAN-HCT) 320-25 MG per tablet, Take 1 tablet by mouth Daily., Disp: 30 tablet, Rfl: 5  •  venlafaxine XR (EFFEXOR-XR) 75 MG 24 hr capsule, , Disp: , Rfl:   •  zolpidem CR (AMBIEN CR) 12.5 MG CR tablet, , Disp: , Rfl: .  she denies medication side effects.    All of the chronic condition(s) listed above are stable w/o issues.    BP  "126/83   Pulse 90   Temp 98.1 °F (36.7 °C) (Oral)   Resp 18   Ht 160 cm (63\")   Wt 71.2 kg (157 lb)   BMI 27.81 kg/m²     Results for orders placed or performed in visit on 09/20/17   Request Problem   Result Value Ref Range    Request Problem CANCELED    POCT urinalysis dipstick, automated   Result Value Ref Range    Color Yellow Yellow, Straw, Dark Yellow, Ashley    Clarity, UA Clear Clear    Glucose, UA Negative Negative, 1000 mg/dL (3+) mg/dL    Bilirubin Negative Negative    Ketones, UA Negative Negative    Specific Gravity  1.020 1.005 - 1.030    Blood, UA Negative Negative    pH, Urine 6.0 5.0 - 8.0    Protein, POC Negative Negative mg/dL    Urobilinogen, UA Normal Normal    Leukocytes Negative Negative    Nitrite, UA Negative Negative   Pap IG, HPV-hr   Result Value Ref Range    Diagnosis Comment     Specimen adequacy: Comment     Clinician Provided ICD-10: Comment     Performed by: Comment     . .     Note: Comment     Method: Comment     HPV, high-risk CANCELED            The following portions of the patient's history were reviewed and updated as appropriate: allergies, current medications, past family history, past medical history, past social history, past surgical history and problem list.    Review of Systems   Constitutional: Negative for activity change, chills, fatigue and fever.   Respiratory: Negative for cough and chest tightness.    Cardiovascular: Negative for chest pain and palpitations.   Gastrointestinal: Negative for abdominal pain and nausea.   Endocrine: Negative for cold intolerance.   Psychiatric/Behavioral: Negative for behavioral problems and dysphoric mood.       Objective   Physical Exam   Constitutional: She appears well-developed and well-nourished.   Neck: Neck supple. No thyromegaly present.   Cardiovascular: Normal rate and regular rhythm.    No murmur heard.  Pulmonary/Chest: Effort normal and breath sounds normal.   Abdominal: Bowel sounds are normal. There is no " tenderness.   Neurological: She is alert.   Psychiatric: She has a normal mood and affect. Her behavior is normal.   Nursing note and vitals reviewed.  Labs reviewed with pt today during visit. All questions answered.      Assessment/Plan   Shell was seen today for medicare wellness, hypertension, hyperlipidemia and hypothyroidism.    Diagnoses and all orders for this visit:    Medicare annual wellness visit, subsequent    Acquired hypothyroidism  -     levothyroxine (SYNTHROID, LEVOTHROID) 100 MCG tablet; Take 1 tablet by mouth Daily.  -     TSH; Future  -     T4, Free; Future    Essential hypertension  -     valsartan-hydrochlorothiazide (DIOVAN-HCT) 320-25 MG per tablet; Take 1 tablet by mouth Daily.  -     Basic Metabolic Panel; Future  -     Lipid Panel; Future    Mixed hyperlipidemia  -     fenofibrate micronized (ANTARA) 130 MG capsule; Take 1 capsule by mouth Every Morning Before Breakfast.  -     atorvastatin (LIPITOR) 20 MG tablet; Take 1 tablet by mouth Daily.  -     Lipid Panel; Future    IFG (impaired fasting glucose)  -     Hemoglobin A1c; Future  -     Basic Metabolic Panel; Future    Pt encouraged to increase cardiovascular fitness and proper diet stressed.

## 2018-07-17 NOTE — PATIENT INSTRUCTIONS
Medicare Wellness  Personal Prevention Plan of Service     Date of Office Visit:  2018  Encounter Provider:  Bryce Soto MD  Place of Service:  [facility]  Patient Name: Shell Sanford  :  1950    As part of the Medicare Wellness portion of your visit today, we are providing you with this personalized preventive plan of services (PPPS). This plan is based upon recommendations of the [place] Preventive Services Task Force (USPSTF) and the Advisory Committee on Immunization Practices (ACIP).    This lists the preventive care services that should be considered, and provides dates of when you are due. Items listed as completed are up-to-date and do not require any further intervention.    Health Maintenance   Topic Date Due   • LIPID PANEL  2018   • INFLUENZA VACCINE  2018   • PAP SMEAR  2018   • MEDICARE ANNUAL WELLNESS  2019   • TDAP/TD VACCINES (2 - Td) 2023   • COLONOSCOPY  2025   • PNEUMOCOCCAL VACCINES (65+ LOW/MEDIUM RISK)  Completed   • HEPATITIS C SCREENING  Excluded   • ZOSTER VACCINE  Excluded       Orders Placed This Encounter   Procedures   • TSH     Standing Status:   Future   • T4, Free     Standing Status:   Future   • Hemoglobin A1c     Standing Status:   Future   • Basic Metabolic Panel     Standing Status:   Future   • Lipid Panel     Standing Status:   Future       Return in about 6 months (around 2019) for Recheck.

## 2018-08-20 ENCOUNTER — TELEPHONE (OUTPATIENT)
Dept: FAMILY MEDICINE CLINIC | Facility: CLINIC | Age: 68
End: 2018-08-20

## 2018-08-20 DIAGNOSIS — I10 ESSENTIAL HYPERTENSION: Primary | ICD-10-CM

## 2018-08-20 RX ORDER — HYDROCHLOROTHIAZIDE 25 MG/1
25 TABLET ORAL DAILY
Qty: 30 TABLET | Refills: 4 | Status: SHIPPED | OUTPATIENT
Start: 2018-08-20 | End: 2019-01-02 | Stop reason: SDUPTHER

## 2018-08-20 RX ORDER — LOSARTAN POTASSIUM 100 MG/1
100 TABLET ORAL DAILY
Qty: 30 TABLET | Refills: 4 | Status: SHIPPED | OUTPATIENT
Start: 2018-08-20 | End: 2019-01-02 | Stop reason: SDUPTHER

## 2018-12-17 ENCOUNTER — RESULTS ENCOUNTER (OUTPATIENT)
Dept: FAMILY MEDICINE CLINIC | Facility: CLINIC | Age: 68
End: 2018-12-17

## 2018-12-17 DIAGNOSIS — E78.2 MIXED HYPERLIPIDEMIA: ICD-10-CM

## 2018-12-17 DIAGNOSIS — I10 ESSENTIAL HYPERTENSION: ICD-10-CM

## 2018-12-17 DIAGNOSIS — E03.9 ACQUIRED HYPOTHYROIDISM: ICD-10-CM

## 2018-12-17 DIAGNOSIS — R73.01 IFG (IMPAIRED FASTING GLUCOSE): ICD-10-CM

## 2018-12-25 DIAGNOSIS — I10 ESSENTIAL HYPERTENSION: ICD-10-CM

## 2018-12-25 DIAGNOSIS — E03.9 ACQUIRED HYPOTHYROIDISM: ICD-10-CM

## 2018-12-25 DIAGNOSIS — E78.2 MIXED HYPERLIPIDEMIA: ICD-10-CM

## 2018-12-26 RX ORDER — FENOFIBRATE 130 MG/1
130 CAPSULE ORAL
Qty: 30 CAPSULE | Refills: 5 | OUTPATIENT
Start: 2018-12-26

## 2018-12-26 RX ORDER — ATORVASTATIN CALCIUM 20 MG/1
20 TABLET, FILM COATED ORAL DAILY
Qty: 30 TABLET | Refills: 5 | OUTPATIENT
Start: 2018-12-26

## 2018-12-26 RX ORDER — HYDROCHLOROTHIAZIDE 25 MG/1
TABLET ORAL
Qty: 30 TABLET | Refills: 4 | OUTPATIENT
Start: 2018-12-26

## 2018-12-26 RX ORDER — LOSARTAN POTASSIUM 100 MG/1
TABLET ORAL
Qty: 30 TABLET | Refills: 4 | OUTPATIENT
Start: 2018-12-26

## 2018-12-26 RX ORDER — LEVOTHYROXINE SODIUM 0.1 MG/1
100 TABLET ORAL DAILY
Qty: 30 TABLET | Refills: 5 | OUTPATIENT
Start: 2018-12-26

## 2019-01-02 DIAGNOSIS — E03.9 ACQUIRED HYPOTHYROIDISM: ICD-10-CM

## 2019-01-02 DIAGNOSIS — I10 ESSENTIAL HYPERTENSION: ICD-10-CM

## 2019-01-02 DIAGNOSIS — E78.2 MIXED HYPERLIPIDEMIA: ICD-10-CM

## 2019-01-02 RX ORDER — ATORVASTATIN CALCIUM 20 MG/1
20 TABLET, FILM COATED ORAL DAILY
Qty: 30 TABLET | Refills: 0 | Status: SHIPPED | OUTPATIENT
Start: 2019-01-02 | End: 2019-01-17 | Stop reason: SDUPTHER

## 2019-01-02 RX ORDER — FENOFIBRATE 130 MG/1
130 CAPSULE ORAL
Qty: 30 CAPSULE | Refills: 0 | Status: SHIPPED | OUTPATIENT
Start: 2019-01-02 | End: 2019-01-17 | Stop reason: SDUPTHER

## 2019-01-02 RX ORDER — HYDROCHLOROTHIAZIDE 25 MG/1
TABLET ORAL
Qty: 30 TABLET | Refills: 0 | Status: SHIPPED | OUTPATIENT
Start: 2019-01-02 | End: 2019-01-17 | Stop reason: SDUPTHER

## 2019-01-02 RX ORDER — LEVOTHYROXINE SODIUM 0.1 MG/1
100 TABLET ORAL DAILY
Qty: 30 TABLET | Refills: 0 | Status: SHIPPED | OUTPATIENT
Start: 2019-01-02 | End: 2019-01-17 | Stop reason: SDUPTHER

## 2019-01-02 RX ORDER — LOSARTAN POTASSIUM 100 MG/1
TABLET ORAL
Qty: 30 TABLET | Refills: 0 | Status: SHIPPED | OUTPATIENT
Start: 2019-01-02 | End: 2019-01-17 | Stop reason: SDUPTHER

## 2019-01-03 DIAGNOSIS — I10 ESSENTIAL HYPERTENSION: ICD-10-CM

## 2019-01-03 DIAGNOSIS — E78.2 MIXED HYPERLIPIDEMIA: ICD-10-CM

## 2019-01-03 DIAGNOSIS — E03.9 ACQUIRED HYPOTHYROIDISM: ICD-10-CM

## 2019-01-04 RX ORDER — ATORVASTATIN CALCIUM 20 MG/1
20 TABLET, FILM COATED ORAL DAILY
Qty: 30 TABLET | Refills: 0 | OUTPATIENT
Start: 2019-01-04

## 2019-01-04 RX ORDER — HYDROCHLOROTHIAZIDE 25 MG/1
TABLET ORAL
Qty: 30 TABLET | Refills: 0 | OUTPATIENT
Start: 2019-01-04

## 2019-01-04 RX ORDER — LOSARTAN POTASSIUM 100 MG/1
TABLET ORAL
Qty: 30 TABLET | Refills: 0 | OUTPATIENT
Start: 2019-01-04

## 2019-01-04 RX ORDER — LEVOTHYROXINE SODIUM 0.1 MG/1
100 TABLET ORAL DAILY
Qty: 30 TABLET | Refills: 0 | OUTPATIENT
Start: 2019-01-04

## 2019-01-04 RX ORDER — FENOFIBRATE 130 MG/1
130 CAPSULE ORAL
Qty: 30 CAPSULE | Refills: 0 | OUTPATIENT
Start: 2019-01-04

## 2019-01-17 ENCOUNTER — OFFICE VISIT (OUTPATIENT)
Dept: FAMILY MEDICINE CLINIC | Facility: CLINIC | Age: 69
End: 2019-01-17

## 2019-01-17 VITALS
HEIGHT: 63 IN | RESPIRATION RATE: 18 BRPM | DIASTOLIC BLOOD PRESSURE: 80 MMHG | WEIGHT: 156 LBS | BODY MASS INDEX: 27.64 KG/M2 | HEART RATE: 90 BPM | SYSTOLIC BLOOD PRESSURE: 128 MMHG | TEMPERATURE: 98.3 F

## 2019-01-17 DIAGNOSIS — I10 ESSENTIAL HYPERTENSION: Primary | ICD-10-CM

## 2019-01-17 DIAGNOSIS — R73.01 IFG (IMPAIRED FASTING GLUCOSE): ICD-10-CM

## 2019-01-17 DIAGNOSIS — F51.01 PRIMARY INSOMNIA: ICD-10-CM

## 2019-01-17 DIAGNOSIS — E78.2 MIXED HYPERLIPIDEMIA: ICD-10-CM

## 2019-01-17 DIAGNOSIS — E03.9 ACQUIRED HYPOTHYROIDISM: ICD-10-CM

## 2019-01-17 PROCEDURE — 99214 OFFICE O/P EST MOD 30 MIN: CPT | Performed by: FAMILY MEDICINE

## 2019-01-17 RX ORDER — HYDROXYZINE HYDROCHLORIDE 25 MG/1
TABLET, FILM COATED ORAL
Qty: 60 TABLET | Refills: 11 | Status: SHIPPED | OUTPATIENT
Start: 2019-01-17 | End: 2020-09-09 | Stop reason: SDUPTHER

## 2019-01-17 RX ORDER — LEVOTHYROXINE SODIUM 0.1 MG/1
100 TABLET ORAL DAILY
Qty: 30 TABLET | Refills: 11 | Status: SHIPPED | OUTPATIENT
Start: 2019-01-17 | End: 2019-03-03 | Stop reason: SDUPTHER

## 2019-01-17 RX ORDER — HYDROCHLOROTHIAZIDE 25 MG/1
25 TABLET ORAL DAILY
Qty: 30 TABLET | Refills: 11 | Status: SHIPPED | OUTPATIENT
Start: 2019-01-17 | End: 2019-03-03 | Stop reason: SDUPTHER

## 2019-01-17 RX ORDER — FENOFIBRATE 130 MG/1
130 CAPSULE ORAL
Qty: 30 CAPSULE | Refills: 11 | Status: SHIPPED | OUTPATIENT
Start: 2019-01-17 | End: 2019-03-03 | Stop reason: SDUPTHER

## 2019-01-17 RX ORDER — LOSARTAN POTASSIUM 100 MG/1
100 TABLET ORAL DAILY
Qty: 30 TABLET | Refills: 11 | Status: SHIPPED | OUTPATIENT
Start: 2019-01-17 | End: 2019-03-03 | Stop reason: SDUPTHER

## 2019-01-17 RX ORDER — ATORVASTATIN CALCIUM 20 MG/1
20 TABLET, FILM COATED ORAL DAILY
Qty: 30 TABLET | Refills: 11 | Status: SHIPPED | OUTPATIENT
Start: 2019-01-17 | End: 2019-03-03 | Stop reason: SDUPTHER

## 2019-01-17 NOTE — PROGRESS NOTES
Subjective   Shell Sanford is a 68 y.o. female.     History of Present Illness     Chief Complaint:   Chief Complaint   Patient presents with   • Hypertension     med refill -    • Hypothyroidism   • Hyperlipidemia   • IFG       Shell Sanford 68 y.o. female who presents today for Medical Management of the below listed issues and medication refills.  she has a problem list of   Patient Active Problem List   Diagnosis   • Essential hypertension   • Hyperlipidemia   • Acquired hypothyroidism   • IFG (impaired fasting glucose)   .  Since the last visit, she has overall felt well overall, although is having some continued sleep disturbance due to some prior PTSD issues.  she has been compliant with   Current Outpatient Medications:   •  atorvastatin (LIPITOR) 20 MG tablet, Take 1 tablet by mouth Daily., Disp: 30 tablet, Rfl: 11  •  fenofibrate micronized (ANTARA) 130 MG capsule, Take 1 capsule by mouth Every Morning Before Breakfast., Disp: 30 capsule, Rfl: 11  •  hydrochlorothiazide (HYDRODIURIL) 25 MG tablet, Take 1 tablet by mouth Daily., Disp: 30 tablet, Rfl: 11  •  levothyroxine (SYNTHROID, LEVOTHROID) 100 MCG tablet, Take 1 tablet by mouth Daily., Disp: 30 tablet, Rfl: 11  •  losartan (COZAAR) 100 MG tablet, Take 1 tablet by mouth Daily., Disp: 30 tablet, Rfl: 11  •  ALPRAZolam (XANAX) 0.5 MG tablet, , Disp: , Rfl:   •  ARIPiprazole (ABILIFY) 2 MG tablet, , Disp: , Rfl:   •  clobetasol (TEMOVATE) 0.05 % cream, Apply  topically Every 12 (Twelve) Hours., Disp: 15 g, Rfl: 1  •  diclofenac (VOLTAREN) 1 % gel gel, Apply 4 g topically 4 (four) times a day., Disp: 5 tube, Rfl: 5  •  hydrOXYzine (ATARAX) 25 MG tablet, Take 1-2 tabs QHS prn, Disp: 60 tablet, Rfl: 11  •  lamoTRIgine (LaMICtal) 200 MG tablet, , Disp: , Rfl:   •  oxybutynin (DITROPAN) 5 MG tablet, Take 1 tablet by mouth 3 (Three) Times a Day., Disp: 20 tablet, Rfl: 0  •  venlafaxine XR (EFFEXOR-XR) 75 MG 24 hr capsule, , Disp: , Rfl:   •  zolpidem CR (AMBIEN  "CR) 12.5 MG CR tablet, , Disp: , Rfl: .  she denies medication side effects.    All of the chronic condition(s) listed above are stable w/o issues.    /80   Pulse 90   Temp 98.3 °F (36.8 °C) (Oral)   Resp 18   Ht 160 cm (63\")   Wt 70.8 kg (156 lb)   BMI 27.63 kg/m²     Results for orders placed or performed in visit on 09/20/17   Request Problem   Result Value Ref Range    Request Problem CANCELED    POCT urinalysis dipstick, automated   Result Value Ref Range    Color Yellow Yellow, Straw, Dark Yellow, Ashley    Clarity, UA Clear Clear    Glucose, UA Negative Negative, 1000 mg/dL (3+) mg/dL    Bilirubin Negative Negative    Ketones, UA Negative Negative    Specific Gravity  1.020 1.005 - 1.030    Blood, UA Negative Negative    pH, Urine 6.0 5.0 - 8.0    Protein, POC Negative Negative mg/dL    Urobilinogen, UA Normal Normal    Leukocytes Negative Negative    Nitrite, UA Negative Negative   Pap IG, HPV-hr   Result Value Ref Range    Diagnosis Comment     Specimen adequacy: Comment     Clinician Provided ICD-10: Comment     Performed by: Comment     . .     Note: Comment     Method: Comment     HPV, high-risk CANCELED            The following portions of the patient's history were reviewed and updated as appropriate: allergies, current medications, past family history, past medical history, past social history, past surgical history and problem list.    Review of Systems   Constitutional: Negative for activity change, chills, fatigue and fever.   Respiratory: Negative for cough and chest tightness.    Cardiovascular: Negative for chest pain and palpitations.   Gastrointestinal: Negative for abdominal pain and nausea.   Endocrine: Negative for cold intolerance.   Psychiatric/Behavioral: Negative for behavioral problems and dysphoric mood.       Objective   Physical Exam   Constitutional: She appears well-developed and well-nourished.   Neck: Neck supple. No thyromegaly present.   Cardiovascular: Normal rate " and regular rhythm.   No murmur heard.  Pulmonary/Chest: Effort normal and breath sounds normal.   Abdominal: Bowel sounds are normal. There is no tenderness.   Neurological: She is alert.   Psychiatric: She has a normal mood and affect. Her behavior is normal.   Nursing note and vitals reviewed.  Labs reviewed with pt today during visit. All questions answered.      Assessment/Plan   Shell was seen today for hypertension, hypothyroidism, hyperlipidemia and ifg.    Diagnoses and all orders for this visit:    Essential hypertension  -     hydrochlorothiazide (HYDRODIURIL) 25 MG tablet; Take 1 tablet by mouth Daily.  -     losartan (COZAAR) 100 MG tablet; Take 1 tablet by mouth Daily.    Mixed hyperlipidemia  -     fenofibrate micronized (ANTARA) 130 MG capsule; Take 1 capsule by mouth Every Morning Before Breakfast.  -     atorvastatin (LIPITOR) 20 MG tablet; Take 1 tablet by mouth Daily.    Acquired hypothyroidism  -     levothyroxine (SYNTHROID, LEVOTHROID) 100 MCG tablet; Take 1 tablet by mouth Daily.    IFG (impaired fasting glucose)    Primary insomnia  -     hydrOXYzine (ATARAX) 25 MG tablet; Take 1-2 tabs QHS prn

## 2019-03-03 DIAGNOSIS — E03.9 ACQUIRED HYPOTHYROIDISM: ICD-10-CM

## 2019-03-03 DIAGNOSIS — E78.2 MIXED HYPERLIPIDEMIA: ICD-10-CM

## 2019-03-03 DIAGNOSIS — I10 ESSENTIAL HYPERTENSION: ICD-10-CM

## 2019-03-04 RX ORDER — LOSARTAN POTASSIUM 100 MG/1
TABLET ORAL
Qty: 30 TABLET | Refills: 11 | Status: SHIPPED | OUTPATIENT
Start: 2019-03-04 | End: 2020-04-02 | Stop reason: SDUPTHER

## 2019-03-04 RX ORDER — ATORVASTATIN CALCIUM 20 MG/1
20 TABLET, FILM COATED ORAL DAILY
Qty: 30 TABLET | Refills: 11 | Status: SHIPPED | OUTPATIENT
Start: 2019-03-04 | End: 2020-04-02 | Stop reason: SDUPTHER

## 2019-03-04 RX ORDER — LEVOTHYROXINE SODIUM 0.1 MG/1
100 TABLET ORAL DAILY
Qty: 30 TABLET | Refills: 11 | Status: SHIPPED | OUTPATIENT
Start: 2019-03-04 | End: 2020-04-02 | Stop reason: SDUPTHER

## 2019-03-04 RX ORDER — HYDROCHLOROTHIAZIDE 25 MG/1
TABLET ORAL
Qty: 30 TABLET | Refills: 11 | Status: SHIPPED | OUTPATIENT
Start: 2019-03-04 | End: 2020-03-12 | Stop reason: SDUPTHER

## 2019-03-04 RX ORDER — FENOFIBRATE 130 MG/1
130 CAPSULE ORAL
Qty: 30 CAPSULE | Refills: 11 | Status: SHIPPED | OUTPATIENT
Start: 2019-03-04 | End: 2020-03-26

## 2019-04-10 ENCOUNTER — OFFICE VISIT (OUTPATIENT)
Dept: FAMILY MEDICINE CLINIC | Facility: CLINIC | Age: 69
End: 2019-04-10

## 2019-04-10 VITALS
RESPIRATION RATE: 16 BRPM | OXYGEN SATURATION: 98 % | SYSTOLIC BLOOD PRESSURE: 108 MMHG | BODY MASS INDEX: 28.53 KG/M2 | DIASTOLIC BLOOD PRESSURE: 74 MMHG | WEIGHT: 161 LBS | HEIGHT: 63 IN | HEART RATE: 92 BPM | TEMPERATURE: 97.9 F

## 2019-04-10 DIAGNOSIS — G44.209 TENSION HEADACHE: ICD-10-CM

## 2019-04-10 DIAGNOSIS — R10.11 ABDOMINAL PAIN, RUQ: Primary | ICD-10-CM

## 2019-04-10 PROCEDURE — 99214 OFFICE O/P EST MOD 30 MIN: CPT | Performed by: FAMILY MEDICINE

## 2019-04-10 RX ORDER — CYCLOBENZAPRINE HCL 10 MG
10 TABLET ORAL NIGHTLY PRN
Qty: 30 TABLET | Refills: 2 | Status: SHIPPED | OUTPATIENT
Start: 2019-04-10 | End: 2019-06-13 | Stop reason: SDUPTHER

## 2019-04-10 NOTE — PROGRESS NOTES
"Subjective   Shell Sanford is a 68 y.o. female.     CC: Headache    History of Present Illness     Pt comes in today with an approximately 4 day h/o a left-sided (Parietal) HA. She had originally had a rather severe sinusitis and was treated with Z-Pack. Hurts most when moving the head upward along with looking upward. Under a good deal of stress recently.    Pt also reports developing RUQ pain rather suddenly 2 weeks ago that came/went/comes again. Couldn't get in here so saw a local [facility] MD where XRs were done and she was told she was constipated. Was given several medications w/o help and the pain persists. Still has her GB. Pain worsens with eating. FH: mom and niece with GB out.    The following portions of the patient's history were reviewed and updated as appropriate: allergies, current medications, past family history, past medical history, past social history, past surgical history and problem list.    Review of Systems   Constitutional: Negative for activity change, chills, fatigue and fever.   Respiratory: Negative for cough and chest tightness.    Cardiovascular: Negative for chest pain and palpitations.   Gastrointestinal: Negative for abdominal pain and nausea.   Endocrine: Negative for cold intolerance.   Neurological: Positive for headaches.   Psychiatric/Behavioral: Negative for behavioral problems and dysphoric mood.       /74   Pulse 92   Temp 97.9 °F (36.6 °C)   Resp 16   Ht 160 cm (62.99\")   Wt 73 kg (161 lb)   SpO2 98%   BMI 28.53 kg/m²     Objective   Physical Exam   Constitutional: She appears well-developed and well-nourished.   Neck: Neck supple. No thyromegaly present.   Cardiovascular: Normal rate and regular rhythm.   No murmur heard.  Pulmonary/Chest: Effort normal and breath sounds normal.   Abdominal: Bowel sounds are normal. She exhibits no mass. There is tenderness (RUQ). There is no rebound and no guarding. No hernia.   Musculoskeletal: She exhibits tenderness " (left C2 nerve root exit.).   Neurological: She is alert.   Psychiatric: She has a normal mood and affect. Her behavior is normal.   Nursing note and vitals reviewed.      Assessment/Plan   Shell was seen today for headache and abdominal pain.    Diagnoses and all orders for this visit:    Abdominal pain, RUQ  -     CT abdomen w contrast; Future    Tension headache  -     cyclobenzaprine (FLEXERIL) 10 MG tablet; Take 1 tablet by mouth At Night As Needed for Muscle Spasms.    Will do CCK-HIDA if needed.  Heat to back of the head.

## 2019-06-13 DIAGNOSIS — G44.209 TENSION HEADACHE: ICD-10-CM

## 2019-06-13 RX ORDER — CYCLOBENZAPRINE HCL 10 MG
TABLET ORAL
Qty: 30 TABLET | Refills: 0 | Status: SHIPPED | OUTPATIENT
Start: 2019-06-13 | End: 2020-06-09

## 2019-11-08 ENCOUNTER — OFFICE VISIT (OUTPATIENT)
Dept: FAMILY MEDICINE CLINIC | Facility: CLINIC | Age: 69
End: 2019-11-08

## 2019-11-08 VITALS
OXYGEN SATURATION: 97 % | WEIGHT: 160 LBS | TEMPERATURE: 97.6 F | SYSTOLIC BLOOD PRESSURE: 130 MMHG | BODY MASS INDEX: 28.35 KG/M2 | HEART RATE: 97 BPM | RESPIRATION RATE: 16 BRPM | HEIGHT: 63 IN | DIASTOLIC BLOOD PRESSURE: 72 MMHG

## 2019-11-08 DIAGNOSIS — R59.9 LYMPH NODE ENLARGEMENT: Primary | ICD-10-CM

## 2019-11-08 PROCEDURE — 99213 OFFICE O/P EST LOW 20 MIN: CPT | Performed by: PHYSICIAN ASSISTANT

## 2019-11-08 RX ORDER — FLUCONAZOLE 150 MG/1
150 TABLET ORAL ONCE
Qty: 1 TABLET | Refills: 2 | Status: SHIPPED | OUTPATIENT
Start: 2019-11-08 | End: 2019-11-08

## 2019-11-08 RX ORDER — DOXYCYCLINE HYCLATE 100 MG/1
100 CAPSULE ORAL 2 TIMES DAILY
Qty: 20 CAPSULE | Refills: 0 | Status: SHIPPED | OUTPATIENT
Start: 2019-11-08 | End: 2020-06-09

## 2019-11-08 RX ORDER — LINACLOTIDE 145 UG/1
CAPSULE, GELATIN COATED ORAL
COMMUNITY
Start: 2019-11-02 | End: 2021-03-18

## 2019-11-08 NOTE — PROGRESS NOTES
"Subjective   Shell Sanford is a 69 y.o. female.     History of Present Illness   Shell Sanford 69 y.o. female /72 (BP Location: Left arm, Patient Position: Sitting, Cuff Size: Large Adult)   Pulse 97   Temp 97.6 °F (36.4 °C) (Oral)   Resp 16   Ht 160 cm (62.99\")   Wt 72.6 kg (160 lb)   LMP  (LMP Unknown)   SpO2 97%   BMI 28.35 kg/m²  who presents today for ? Lump right in axilla; few weeks ago noticed; some pain in right shoulder; some soreness in lumpy.  No weight loss, fever, chills.   she has a history of   Patient Active Problem List   Diagnosis   • Essential hypertension   • Hyperlipidemia   • Acquired hypothyroidism   • IFG (impaired fasting glucose)   .      Had double mastectomy; right side breast cancer and also removed left breast;  No chemo or radiation; after 5 years released----not seeing  ; age 18; no breast feeding. Menopause age 49  Sister ---breast cancer    The following portions of the patient's history were reviewed and updated as appropriate: allergies, current medications, past family history, past medical history, past social history, past surgical history and problem list.    Review of Systems   Constitutional: Negative for activity change, appetite change and unexpected weight change.   HENT: Negative for nosebleeds and trouble swallowing.    Eyes: Negative for pain and visual disturbance.   Respiratory: Negative for chest tightness, shortness of breath and wheezing.    Cardiovascular: Negative for chest pain and palpitations.   Gastrointestinal: Negative for abdominal pain and blood in stool.   Endocrine: Negative.    Genitourinary: Negative for difficulty urinating and hematuria.   Musculoskeletal: Negative for joint swelling.   Skin: Negative for color change and rash.   Allergic/Immunologic: Negative.    Neurological: Negative for syncope and speech difficulty.   Hematological: Negative for adenopathy.   Psychiatric/Behavioral: Negative for agitation and confusion. "   All other systems reviewed and are negative.      Objective   Physical Exam   Constitutional: She is oriented to person, place, and time. She appears well-developed and well-nourished. No distress.   HENT:   Head: Normocephalic and atraumatic.   Cardiovascular: Normal rate, regular rhythm and normal heart sounds.   Pulmonary/Chest: Effort normal and breath sounds normal. Right breast exhibits no mass, no skin change and no tenderness. Left breast exhibits no mass, no skin change and no tenderness.   Breast reconstruction; saline implants; I do palp node right axilla only---posterior area 1cm; mobile , no pain; no rash   Lymphadenopathy:     She has axillary adenopathy.        Right axillary: No pectoral and no lateral adenopathy present.        Left axillary: No pectoral and no lateral adenopathy present.  Neurological: She is alert and oriented to person, place, and time.   Skin: She is not diaphoretic.   Psychiatric: Judgment and thought content normal.   Nursing note and vitals reviewed.      Assessment/Plan   Shell was seen today for breast problem.    Diagnoses and all orders for this visit:    Lymph node enlargement  -     US Axilla Right; Future    Other orders  -     doxycycline (VIBRAMYCIN) 100 MG capsule; Take 1 capsule by mouth 2 (Two) Times a Day. For infection  -     fluconazole (DIFLUCAN) 150 MG tablet; Take 1 tablet by mouth 1 (One) Time for 1 dose. One PO X 1 for yeast infection    start Doxy and see if infx; also get u/s---d/t hx breast cancer

## 2019-11-25 ENCOUNTER — HOSPITAL ENCOUNTER (OUTPATIENT)
Dept: ULTRASOUND IMAGING | Facility: HOSPITAL | Age: 69
Discharge: HOME | End: 2019-11-25
Admitting: PHYSICIAN ASSISTANT

## 2019-11-25 DIAGNOSIS — R59.9 LYMPH NODE ENLARGEMENT: ICD-10-CM

## 2019-11-25 PROCEDURE — 76882 US LMTD JT/FCL EVL NVASC XTR: CPT

## 2020-01-13 ENCOUNTER — TELEPHONE (OUTPATIENT)
Dept: FAMILY MEDICINE CLINIC | Facility: CLINIC | Age: 70
End: 2020-01-13

## 2020-01-13 ENCOUNTER — TELEPHONE (OUTPATIENT)
Dept: SURGERY | Facility: CLINIC | Age: 70
End: 2020-01-13

## 2020-01-13 DIAGNOSIS — R59.9 LYMPH NODE ENLARGEMENT: Primary | ICD-10-CM

## 2020-01-13 NOTE — TELEPHONE ENCOUNTER
Miguelito for pt to call    New Pt appointment with Dr OSEGUERA  3-10-20 arrive 9:00      Mailed viv lynn

## 2020-01-26 DIAGNOSIS — E78.2 MIXED HYPERLIPIDEMIA: ICD-10-CM

## 2020-01-26 DIAGNOSIS — E03.9 ACQUIRED HYPOTHYROIDISM: ICD-10-CM

## 2020-01-26 DIAGNOSIS — I10 ESSENTIAL HYPERTENSION: ICD-10-CM

## 2020-01-27 RX ORDER — LEVOTHYROXINE SODIUM 0.1 MG/1
TABLET ORAL
Qty: 30 TABLET | Refills: 11 | OUTPATIENT
Start: 2020-01-27

## 2020-01-27 RX ORDER — ATORVASTATIN CALCIUM 20 MG/1
TABLET, FILM COATED ORAL
Qty: 30 TABLET | Refills: 11 | OUTPATIENT
Start: 2020-01-27

## 2020-01-27 RX ORDER — LOSARTAN POTASSIUM 100 MG/1
TABLET ORAL
Qty: 30 TABLET | Refills: 11 | OUTPATIENT
Start: 2020-01-27

## 2020-02-25 DIAGNOSIS — I10 ESSENTIAL HYPERTENSION: ICD-10-CM

## 2020-02-25 RX ORDER — HYDROCHLOROTHIAZIDE 25 MG/1
TABLET ORAL
Qty: 30 TABLET | Refills: 11 | OUTPATIENT
Start: 2020-02-25

## 2020-03-03 DIAGNOSIS — I10 ESSENTIAL HYPERTENSION: ICD-10-CM

## 2020-03-03 RX ORDER — HYDROCHLOROTHIAZIDE 25 MG/1
TABLET ORAL
Qty: 30 TABLET | Refills: 11 | OUTPATIENT
Start: 2020-03-03

## 2020-03-12 ENCOUNTER — TELEPHONE (OUTPATIENT)
Dept: FAMILY MEDICINE CLINIC | Facility: CLINIC | Age: 70
End: 2020-03-12

## 2020-03-12 DIAGNOSIS — I10 ESSENTIAL HYPERTENSION: ICD-10-CM

## 2020-03-12 RX ORDER — HYDROCHLOROTHIAZIDE 25 MG/1
25 TABLET ORAL DAILY
Qty: 30 TABLET | Refills: 0 | Status: SHIPPED | OUTPATIENT
Start: 2020-03-12 | End: 2020-04-02 | Stop reason: SDUPTHER

## 2020-03-23 DIAGNOSIS — R59.9 PALPABLE LYMPH NODE: Primary | ICD-10-CM

## 2020-03-26 ENCOUNTER — TELEPHONE (OUTPATIENT)
Dept: SURGERY | Facility: CLINIC | Age: 70
End: 2020-03-26

## 2020-03-26 DIAGNOSIS — E78.2 MIXED HYPERLIPIDEMIA: ICD-10-CM

## 2020-03-26 RX ORDER — FENOFIBRATE 130 MG/1
130 CAPSULE ORAL
Qty: 30 CAPSULE | Refills: 0 | Status: SHIPPED | OUTPATIENT
Start: 2020-03-26 | End: 2020-04-02 | Stop reason: SDUPTHER

## 2020-03-26 NOTE — TELEPHONE ENCOUNTER
New patient appt 4/7 rescheduled due to COVID19  6/9/2020 11:30 confirmed with patient.     Patient will need US RT Axilla prior.

## 2020-03-28 DIAGNOSIS — I10 ESSENTIAL HYPERTENSION: ICD-10-CM

## 2020-03-28 DIAGNOSIS — E03.9 ACQUIRED HYPOTHYROIDISM: ICD-10-CM

## 2020-03-28 DIAGNOSIS — E78.2 MIXED HYPERLIPIDEMIA: ICD-10-CM

## 2020-03-30 DIAGNOSIS — I10 ESSENTIAL HYPERTENSION: ICD-10-CM

## 2020-03-30 RX ORDER — HYDROCHLOROTHIAZIDE 25 MG/1
TABLET ORAL
Qty: 30 TABLET | Refills: 0 | OUTPATIENT
Start: 2020-03-30

## 2020-03-30 RX ORDER — LEVOTHYROXINE SODIUM 0.1 MG/1
TABLET ORAL
Qty: 30 TABLET | Refills: 11 | OUTPATIENT
Start: 2020-03-30

## 2020-03-30 RX ORDER — LOSARTAN POTASSIUM 100 MG/1
TABLET ORAL
Qty: 30 TABLET | Refills: 11 | OUTPATIENT
Start: 2020-03-30

## 2020-03-30 RX ORDER — ATORVASTATIN CALCIUM 20 MG/1
TABLET, FILM COATED ORAL
Qty: 30 TABLET | Refills: 11 | OUTPATIENT
Start: 2020-03-30

## 2020-03-31 DIAGNOSIS — I10 ESSENTIAL HYPERTENSION: ICD-10-CM

## 2020-03-31 RX ORDER — HYDROCHLOROTHIAZIDE 25 MG/1
TABLET ORAL
Qty: 30 TABLET | Refills: 0 | OUTPATIENT
Start: 2020-03-31

## 2020-04-01 DIAGNOSIS — E03.9 ACQUIRED HYPOTHYROIDISM: ICD-10-CM

## 2020-04-01 DIAGNOSIS — I10 ESSENTIAL HYPERTENSION: ICD-10-CM

## 2020-04-01 DIAGNOSIS — E78.2 MIXED HYPERLIPIDEMIA: ICD-10-CM

## 2020-04-01 RX ORDER — HYDROCHLOROTHIAZIDE 25 MG/1
TABLET ORAL
Qty: 30 TABLET | Refills: 0 | OUTPATIENT
Start: 2020-04-01

## 2020-04-01 RX ORDER — ATORVASTATIN CALCIUM 20 MG/1
TABLET, FILM COATED ORAL
Qty: 30 TABLET | Refills: 11 | OUTPATIENT
Start: 2020-04-01

## 2020-04-01 RX ORDER — LEVOTHYROXINE SODIUM 0.1 MG/1
TABLET ORAL
Qty: 30 TABLET | Refills: 11 | OUTPATIENT
Start: 2020-04-01

## 2020-04-01 RX ORDER — LOSARTAN POTASSIUM 100 MG/1
TABLET ORAL
Qty: 30 TABLET | Refills: 11 | OUTPATIENT
Start: 2020-04-01

## 2020-04-02 ENCOUNTER — DOCUMENTATION (OUTPATIENT)
Dept: FAMILY MEDICINE CLINIC | Facility: CLINIC | Age: 70
End: 2020-04-02

## 2020-04-02 DIAGNOSIS — E78.2 MIXED HYPERLIPIDEMIA: ICD-10-CM

## 2020-04-02 DIAGNOSIS — E03.9 ACQUIRED HYPOTHYROIDISM: ICD-10-CM

## 2020-04-02 DIAGNOSIS — I10 ESSENTIAL HYPERTENSION: ICD-10-CM

## 2020-04-02 RX ORDER — LEVOTHYROXINE SODIUM 0.1 MG/1
100 TABLET ORAL DAILY
Qty: 30 TABLET | Refills: 11 | Status: SHIPPED | OUTPATIENT
Start: 2020-04-02 | End: 2021-03-23 | Stop reason: SDUPTHER

## 2020-04-02 RX ORDER — ATORVASTATIN CALCIUM 20 MG/1
20 TABLET, FILM COATED ORAL DAILY
Qty: 30 TABLET | Refills: 11 | Status: SHIPPED | OUTPATIENT
Start: 2020-04-02 | End: 2020-06-09

## 2020-04-02 RX ORDER — HYDROCHLOROTHIAZIDE 25 MG/1
25 TABLET ORAL DAILY
Qty: 30 TABLET | Refills: 11 | Status: SHIPPED | OUTPATIENT
Start: 2020-04-02 | End: 2020-06-09

## 2020-04-02 RX ORDER — FENOFIBRATE 130 MG/1
130 CAPSULE ORAL
Qty: 30 CAPSULE | Refills: 11 | Status: SHIPPED | OUTPATIENT
Start: 2020-04-02 | End: 2021-04-05 | Stop reason: SDUPTHER

## 2020-04-02 RX ORDER — LOSARTAN POTASSIUM 100 MG/1
100 TABLET ORAL DAILY
Qty: 30 TABLET | Refills: 11 | Status: SHIPPED | OUTPATIENT
Start: 2020-04-02 | End: 2020-06-09

## 2020-04-27 ENCOUNTER — TELEPHONE (OUTPATIENT)
Dept: SURGERY | Facility: CLINIC | Age: 70
End: 2020-04-27

## 2020-04-27 NOTE — TELEPHONE ENCOUNTER
Miguelito for pt to call    Scheduled Rt Janet U/S @ [facility] 6-4-2020 @ 11:30    Tiara

## 2020-06-02 DIAGNOSIS — I10 ESSENTIAL HYPERTENSION: Primary | ICD-10-CM

## 2020-06-02 DIAGNOSIS — R73.01 IFG (IMPAIRED FASTING GLUCOSE): ICD-10-CM

## 2020-06-02 DIAGNOSIS — E78.2 MIXED HYPERLIPIDEMIA: ICD-10-CM

## 2020-06-02 DIAGNOSIS — E03.9 ACQUIRED HYPOTHYROIDISM: ICD-10-CM

## 2020-06-03 ENCOUNTER — RESULTS ENCOUNTER (OUTPATIENT)
Dept: FAMILY MEDICINE CLINIC | Facility: CLINIC | Age: 70
End: 2020-06-03

## 2020-06-03 DIAGNOSIS — R73.01 IFG (IMPAIRED FASTING GLUCOSE): ICD-10-CM

## 2020-06-03 DIAGNOSIS — I10 ESSENTIAL HYPERTENSION: ICD-10-CM

## 2020-06-03 DIAGNOSIS — E03.9 ACQUIRED HYPOTHYROIDISM: ICD-10-CM

## 2020-06-03 DIAGNOSIS — E78.2 MIXED HYPERLIPIDEMIA: ICD-10-CM

## 2020-06-04 ENCOUNTER — HOSPITAL ENCOUNTER (OUTPATIENT)
Dept: ULTRASOUND IMAGING | Facility: HOSPITAL | Age: 70
Discharge: HOME | End: 2020-06-04
Admitting: SURGERY

## 2020-06-04 DIAGNOSIS — R59.9 PALPABLE LYMPH NODE: ICD-10-CM

## 2020-06-04 PROCEDURE — 76882 US LMTD JT/FCL EVL NVASC XTR: CPT

## 2020-06-05 LAB
ALBUMIN SERPL-MCNC: 4.9 G/DL (ref 3.5–5.2)
ALBUMIN/GLOB SERPL: 1.9 G/DL
ALP SERPL-CCNC: 70 U/L (ref 39–117)
ALT SERPL-CCNC: 33 U/L (ref 1–33)
AST SERPL-CCNC: 34 U/L (ref 1–32)
BASOPHILS # BLD AUTO: 0.06 10*3/MM3 (ref 0–0.2)
BASOPHILS NFR BLD AUTO: 0.6 % (ref 0–1.5)
BILIRUB SERPL-MCNC: 0.5 MG/DL (ref 0.2–1.2)
BUN SERPL-MCNC: 31 MG/DL (ref 8–23)
BUN/CREAT SERPL: 22.8 (ref 7–25)
CALCIUM SERPL-MCNC: 10.1 MG/DL (ref 8.6–10.5)
CHLORIDE SERPL-SCNC: 98 MMOL/L (ref 98–107)
CHOLEST SERPL-MCNC: 193 MG/DL (ref 0–200)
CO2 SERPL-SCNC: 25.5 MMOL/L (ref 22–29)
CREAT SERPL-MCNC: 1.36 MG/DL (ref 0.57–1)
EOSINOPHIL # BLD AUTO: 0.26 10*3/MM3 (ref 0–0.4)
EOSINOPHIL NFR BLD AUTO: 2.7 % (ref 0.3–6.2)
ERYTHROCYTE [DISTWIDTH] IN BLOOD BY AUTOMATED COUNT: 12.9 % (ref 12.3–15.4)
GLOBULIN SER CALC-MCNC: 2.6 GM/DL
GLUCOSE SERPL-MCNC: 116 MG/DL (ref 65–99)
HBA1C MFR BLD: 6.6 % (ref 4.8–5.6)
HCT VFR BLD AUTO: 38.1 % (ref 34–46.6)
HDLC SERPL-MCNC: 56 MG/DL (ref 40–60)
HGB BLD-MCNC: 12.9 G/DL (ref 12–15.9)
IMM GRANULOCYTES # BLD AUTO: 0.03 10*3/MM3 (ref 0–0.05)
IMM GRANULOCYTES NFR BLD AUTO: 0.3 % (ref 0–0.5)
LDLC SERPL CALC-MCNC: 103 MG/DL (ref 0–100)
LYMPHOCYTES # BLD AUTO: 3.43 10*3/MM3 (ref 0.7–3.1)
LYMPHOCYTES NFR BLD AUTO: 35.5 % (ref 19.6–45.3)
MCH RBC QN AUTO: 29.3 PG (ref 26.6–33)
MCHC RBC AUTO-ENTMCNC: 33.9 G/DL (ref 31.5–35.7)
MCV RBC AUTO: 86.6 FL (ref 79–97)
MONOCYTES # BLD AUTO: 0.73 10*3/MM3 (ref 0.1–0.9)
MONOCYTES NFR BLD AUTO: 7.6 % (ref 5–12)
NEUTROPHILS # BLD AUTO: 5.14 10*3/MM3 (ref 1.7–7)
NEUTROPHILS NFR BLD AUTO: 53.3 % (ref 42.7–76)
NRBC BLD AUTO-RTO: 0 /100 WBC (ref 0–0.2)
PLATELET # BLD AUTO: 392 10*3/MM3 (ref 140–450)
POTASSIUM SERPL-SCNC: 4.5 MMOL/L (ref 3.5–5.2)
PROT SERPL-MCNC: 7.5 G/DL (ref 6–8.5)
RBC # BLD AUTO: 4.4 10*6/MM3 (ref 3.77–5.28)
SODIUM SERPL-SCNC: 136 MMOL/L (ref 136–145)
T3FREE SERPL-MCNC: 3.1 PG/ML (ref 2–4.4)
T4 FREE SERPL-MCNC: 1.72 NG/DL (ref 0.93–1.7)
TRIGL SERPL-MCNC: 169 MG/DL (ref 0–150)
TSH SERPL DL<=0.005 MIU/L-ACNC: 0.31 UIU/ML (ref 0.27–4.2)
VLDLC SERPL CALC-MCNC: 33.8 MG/DL (ref 5–40)
WBC # BLD AUTO: 9.65 10*3/MM3 (ref 3.4–10.8)

## 2020-06-08 PROBLEM — I12.9 HYPERTENSIVE CHRONIC KIDNEY DISEASE WITH STAGE 1 THROUGH STAGE 4 CHRONIC KIDNEY DISEASE, OR UNSPECIFIED CHRONIC KIDNEY DISEASE: Status: ACTIVE | Noted: 2018-10-25

## 2020-06-09 ENCOUNTER — OFFICE VISIT (OUTPATIENT)
Dept: FAMILY MEDICINE CLINIC | Facility: CLINIC | Age: 70
End: 2020-06-09

## 2020-06-09 ENCOUNTER — TELEPHONE (OUTPATIENT)
Dept: SURGERY | Facility: CLINIC | Age: 70
End: 2020-06-09

## 2020-06-09 ENCOUNTER — OFFICE VISIT (OUTPATIENT)
Dept: SURGERY | Facility: CLINIC | Age: 70
End: 2020-06-09

## 2020-06-09 VITALS
SYSTOLIC BLOOD PRESSURE: 113 MMHG | DIASTOLIC BLOOD PRESSURE: 74 MMHG | TEMPERATURE: 97.7 F | HEIGHT: 63 IN | WEIGHT: 159 LBS | RESPIRATION RATE: 18 BRPM | BODY MASS INDEX: 28.17 KG/M2 | HEART RATE: 90 BPM

## 2020-06-09 VITALS
TEMPERATURE: 97.5 F | WEIGHT: 161 LBS | HEART RATE: 102 BPM | HEIGHT: 63 IN | OXYGEN SATURATION: 96 % | DIASTOLIC BLOOD PRESSURE: 79 MMHG | BODY MASS INDEX: 28.53 KG/M2 | SYSTOLIC BLOOD PRESSURE: 148 MMHG

## 2020-06-09 DIAGNOSIS — D05.11 DUCTAL CARCINOMA IN SITU (DCIS) OF RIGHT BREAST: ICD-10-CM

## 2020-06-09 DIAGNOSIS — Z00.00 MEDICARE ANNUAL WELLNESS VISIT, SUBSEQUENT: Primary | ICD-10-CM

## 2020-06-09 DIAGNOSIS — E03.9 ACQUIRED HYPOTHYROIDISM: ICD-10-CM

## 2020-06-09 DIAGNOSIS — N18.9 CHRONIC KIDNEY DISEASE, UNSPECIFIED CKD STAGE: ICD-10-CM

## 2020-06-09 DIAGNOSIS — E11.9 TYPE 2 DIABETES MELLITUS WITHOUT COMPLICATION, WITHOUT LONG-TERM CURRENT USE OF INSULIN (HCC): ICD-10-CM

## 2020-06-09 DIAGNOSIS — M79.621 AXILLARY PAIN, RIGHT: Primary | ICD-10-CM

## 2020-06-09 DIAGNOSIS — Z80.3 FH: BREAST CANCER: ICD-10-CM

## 2020-06-09 DIAGNOSIS — E78.00 PURE HYPERCHOLESTEROLEMIA: ICD-10-CM

## 2020-06-09 DIAGNOSIS — I10 ESSENTIAL HYPERTENSION: ICD-10-CM

## 2020-06-09 PROCEDURE — G0439 PPPS, SUBSEQ VISIT: HCPCS | Performed by: FAMILY MEDICINE

## 2020-06-09 PROCEDURE — 99214 OFFICE O/P EST MOD 30 MIN: CPT | Performed by: FAMILY MEDICINE

## 2020-06-09 PROCEDURE — 99202 OFFICE O/P NEW SF 15 MIN: CPT | Performed by: SURGERY

## 2020-06-09 RX ORDER — ATORVASTATIN CALCIUM 40 MG/1
40 TABLET, FILM COATED ORAL DAILY
Qty: 30 TABLET | Refills: 5 | Status: CANCELLED | OUTPATIENT
Start: 2020-06-09

## 2020-06-09 RX ORDER — ATORVASTATIN CALCIUM 40 MG/1
40 TABLET, FILM COATED ORAL DAILY
Qty: 30 TABLET | Refills: 5 | Status: SHIPPED | OUTPATIENT
Start: 2020-06-09 | End: 2020-11-12

## 2020-06-09 RX ORDER — LOSARTAN POTASSIUM 25 MG/1
25 TABLET ORAL DAILY
Qty: 30 TABLET | Refills: 5 | Status: SHIPPED | OUTPATIENT
Start: 2020-06-09 | End: 2020-11-12

## 2020-06-09 NOTE — PROGRESS NOTES
Chief Complaint: Shell Sanford is a 69 y.o. female who was seen in consultation at the request of Bryce Soto MD  for right axilla mass.       She has the following breast cancer history:  She had her mammograms annually, and had noted no masses, skin changes, nipple changse, nipple dsicahrge in either breast at the time of her most recent imaging. She had noted some pruritis and breast pain mid upper LEFT breast off and on over the past year or so. Her most recent imaging includes bilateral mammogram at [facility] 5-26-11 that showed a cluster of microcalcifications posterior 1.3 breast RIGHT 12:00, BR0, LEFT negative. She then had a RIGHT DX mammgoram BR4 for indetermiante microcalcficiations clustered upper breast. She then had a stereotactic breast biopsy 6-10-11 12:00 - 8G mmamotomy, DCIS intermediate grade with necrosis, tiny focus worrisome for microinvasion, largest DCIS span is 7mm. CAlcifications present. ER >90%, NM> 95%. Stains psnding for microinvasion.     Mrs. Sanford pathology reports returned as diagnosis based on myoepithelial cell staining consistent with DCIS, (rule out invasion on initial specimen).  Mrs. Sanford had a breast MRI 6-27-11-- showed enhancement in the 10:00 plane anterior to posterior over a 4.5 cm long region x1.4x1.4cm. Mammographically there are calcs in this regiom. This is separate from the central 12:00 region of DCIS already biopsied- at this site there is some linear enhancement extending 1.2 cm anterior to the biopsy site suspicious for residual disease. No RIGHT adenopathy seen. LEFt breast negative.    Based on the above, as well as the additional worrisome microcalcifications seen on mammography, I scheduled her for a stereotactic breast biopsy, RIGHT- she had this done on 6-29-11 and the pathology returned as RIGHT breast DCIS, low to int grade, solid and cribiform, background ADH.    We went to the operating room on 7-20-11 for a bilateral total mastectomy and a RIGHT sentinel  node biopsy. She has done well since that procedure. Her drains have ranged from 27-54 daily, and she has 4. She has had no warmth, redness to the skin flaps and no fever.   Her pathology returned as  1.2 cm residual DCIS and a separate focus one cm away of DCIS very small. Margins negative.  0/2 sentinel nodes. LEFT breast benign.     Mrs. Sanford returned to the operating room with Dr Philippe for excision of some ischemic skin at her incision, soom after surgery.     History of Present Illness:  Patient presents with axillary discomfort, RIGHT.  She describes it as achy and in her armpit and upper arm flexor surface.  She noted no new masses, skin changes, nipple discharge, nipple changes prior to her most recent imaging.    Her most recent imaging includes the following:   [facility] right axillary ultrasound June 5, 2020.  Done for area of palpable concern right axilla.  A normal-appearing right axillary lymph node is seen measuring 1.6 cm.  Negative right axillary ultrasound.    She had not had a breast biopsy prior to her cancer diagnosis.  She is postmenopausal and took premarin for 15 years, since age 44 prior to her diagnosis.  She has a FH of breast cancer in one of her 2 sisters, diagnosed at age 39. SHe has 2 sisters, 4 maternal aunts, 5 paternal aunts, and one daughter. Other than her one sister, none of the women in her family have been affected with breast cancer. No ovarian cancer.         She is here for evaluation.    Review of Systems:  Review of Systems   Endocrine: Positive for hot flashes.   Psychiatric/Behavioral: Positive for depression and sleep disturbance. The patient is nervous/anxious.    All other systems reviewed and are negative.       Past Medical and Surgical History:  Breast Biopsy History:  Patient has had the following breast biopsies:2011 right breast DCIS   Breast Cancer HIstory:  Patient has the following past medical history of breast cancer treatment:  2011  bilateral mastectomy left SLNB 6/29/11.   7-20-11 bilateral total mastectomy and a RIGHT sentinel node biopsy- 1.2 cm residual DCIS and a separate fosuc one cm away of DCIS very small. Margins negative. 0/2 sentinel nodes. LEFT breast benign.     Breast Operations, and year:    Obstetric/Gynecologic History:  Age menstrual periods began:   Patient is postmenopausal due to removal of her uterus and both ovaries in the following year:1979   Number of pregnancies: 1  Number of live births: 1  Number of abortions or miscarriages: 0  Age of delivery of first child: 17  Patient did not breast feed.  Length of time taking birth control pills:1 yr  Patient took hormone replacement during the following dates: 10 yrs.   Patient had uterus and ovaries removed 2011.     Past Surgical History:   Procedure Laterality Date   • BREAST SURGERY     • COLONOSCOPY  11/2015   • HYSTERECTOMY     • MASTECTOMY  2012    double     Past Medical History:   Diagnosis Date   • Anxiety    • Bipolar affect, depressed (CMS/HCC)    • Cancer (CMS/HCC)     BREAST   • H/O complete eye exam 01/01/2017   • Hypertension    • Hypothyroidism    • Sleep apnea        Prior Hospitalizations, other than for surgery or childbirth, and year:  [facility] ([facility] years ago)    Social History     Socioeconomic History   • Marital status:      Spouse name: Not on file   • Number of children: Not on file   • Years of education: Not on file   • Highest education level: Not on file   Tobacco Use   • Smoking status: Current Some Day Smoker     Packs/day: 0.25     Types: Cigarettes     Start date: 1980   • Smokeless tobacco: Never Used   Substance and Sexual Activity   • Alcohol use: No   • Drug use: Defer   • Sexual activity: Defer     Patient is .  Patient is retired.  Patient drinks 1-2 servings of caffeine per day.    Family History:  Family History   Problem Relation Age of Onset   • Colon cancer Father 60   • Breast cancer Sister 39       Vital  "Signs:  /79   Pulse 102   Temp 97.5 °F (36.4 °C) (Temporal)   Ht 160 cm (63\")   Wt 73 kg (161 lb)   LMP  (LMP Unknown)   SpO2 96%   Breastfeeding No   BMI 28.52 kg/m²      Medications:    Current Outpatient Medications:   •  ALPRAZolam (XANAX) 0.5 MG tablet, , Disp: , Rfl:   •  ARIPiprazole (ABILIFY) 2 MG tablet, , Disp: , Rfl:   •  atorvastatin (LIPITOR) 40 MG tablet, Take 1 tablet by mouth Daily., Disp: 30 tablet, Rfl: 5  •  fenofibrate micronized (ANTARA) 130 MG capsule, Take 1 capsule by mouth Every Morning Before Breakfast., Disp: 30 capsule, Rfl: 11  •  lamoTRIgine (LaMICtal) 200 MG tablet, , Disp: , Rfl:   •  levothyroxine (SYNTHROID, LEVOTHROID) 100 MCG tablet, Take 1 tablet by mouth Daily., Disp: 30 tablet, Rfl: 11  •  LINZESS 145 MCG capsule capsule, , Disp: , Rfl:   •  losartan (COZAAR) 25 MG tablet, Take 1 tablet by mouth Daily., Disp: 30 tablet, Rfl: 5  •  venlafaxine XR (EFFEXOR-XR) 75 MG 24 hr capsule, , Disp: , Rfl:   •  zolpidem CR (AMBIEN CR) 12.5 MG CR tablet, , Disp: , Rfl:   •  diclofenac (VOLTAREN) 1 % gel gel, Apply 4 g topically 4 (four) times a day., Disp: 5 tube, Rfl: 5  •  hydrOXYzine (ATARAX) 25 MG tablet, Take 1-2 tabs QHS prn, Disp: 60 tablet, Rfl: 11     Allergies:  Allergies   Allergen Reactions   • Keflet [Cephalexin]    • Neosporin [Neomycin-Polymyxin-Gramicidin]    • Penicillins        Physical Examination:  /79   Pulse 102   Temp 97.5 °F (36.4 °C) (Temporal)   Ht 160 cm (63\")   Wt 73 kg (161 lb)   LMP  (LMP Unknown)   SpO2 96%   Breastfeeding No   BMI 28.52 kg/m²   General Appearance:  Patient is in no distress.  She is well kept and has a  obese  build.   Psychiatric:  Patient with appropriate mood and affect. Alert and oriented to self, time, and place.    Breast, RIGHT: Surgically absent, implant in place, symmetric to LEFT in terms of the reconstruction and the size.  Well healed inframammary incision. No suspicious skin findings, " nodules.    Breast, LEFT: Surgically absent, symmetric to LEFT in terms of the reconstruction and the size. Well healed inframammary incision. No suspicious skin findings, nodules.    Lymphatic:  There is no axillary, cervical, infraclavicular, or supraclavicular adenopathy bilaterally.  Specifically in the right axilla there are no masses.  There is no focal tenderness to palpation.  Eyes:  Pupils are round and reactive to light.  Cardiovascular:  Heart rate and rhythm are regular.  Respiratory:  Lungs are clear bilaterally with no crackles or wheezes in any lung field.  Gastrointestinal:  Abdomen is soft, nondistended, and nontender.  Musculoskeletal:  Good strength in all 4 extremities.  There is good range of motion in both shoulders.   Skin:   No new skin lesions or rashes on the skin excluding the breast (see breast exam above).        Imaging:  bilateral mammogram at [facility] 5-26-11 that showed a cluster of microcalcifications posterior 1.3 breast RIGHT 12:00, BR0, LEFT negative. She then had a RIGHT DX mammgoram BR4 for indetermiante microcalcficiations clustered upper breast.     MRI 6-27-11-- showed enhancement in the 10:00 plane anterior to posterior over a 4.5 cm long region x1.4x1.4cm. Mammographically there are calcs in this regiom. This is separate from the central 12:00 region of DCIS already biopsied- at this site there is some linear enhancement extending 1.2 cm anterior to the biopsy site suspicious for residual disease. No RIGHT adenopathy seen. LEFt breast negative.      02/14/2017  [facility]    Radiology  Shell Sanford  US AXILLA LEFT  Negative left axillary sonography.    11/26/2019  [facility]    Radiology  Shell Sanford  US AXILLA RIGHT  There is a normal appearing lymph node that measures up to 0.9 cm in greatest dimension in this region. No abnormality is appreciated.  Negative right axillary sonography.    01/13/2020  [facility]    Referral   Shell Sanford  Diagnosis: Lymph node  enlargement.  Referring Provider: Bryce Soto    06/05/20 [facility]  RIGHT AXILLA SONOGRAPHY  CONRADO SANFORD  HISTORY: area of palpable concern in the right axilla. A normal appearing right axillary lymph node measure on the order 1.6 cm in greatest dimensions is noted. Negative right axillary sonography.     Pathology:  RIGHT stereotactic breast biopsy 6-10-11 12:00central RIGHT breast - 8G mmamotomy, DCIS intermediate grade with necrosis, tiny focus worrisome for microinvasion, largest DCIS span is 7mm. Calcifications present. ER >90%, OR> 95%. Stains  for microinvasion negative.    RIGHT 10:00 stereotactic breast biopsy, 6-29-11 - pathology returned as RIGHT breast DCIS, low to int grade, solid and cribiform, background ADH,  ER 90% and OR positive 40%     7-20-11 bilateral total mastectomy and a RIGHT sentinel node biopsy- 1.2 cm residual DCIS and a separate fosuc one cm away of DCIS very small. Margins negative. 0/2 sentinel nodes. LEFT breast benign.     Procedures:      Assessment:   Diagnosis Plan   1. Axillary pain, right     2. Ductal carcinoma in situ (DCIS) of right breast     3. FH: breast cancer     4. Chronic kidney disease, unspecified CKD stage     1-  RIGHT achy pain with no focal exam findings and normal ultrasound 6-2020    2-  RIGHT breast DCIS-  seen as microcalcifications on mammogram- multicentric in nature involving central and lateral-UOQ RIGHT breast , Er and OR positive  1.2 cm residual DCIS in addition to other areas 1cm away- multifocal DCIS.  Pathologic stage [id number]- IA breast cancer, margins negative      3-  Sister age 39- recommended years ago and again today that sister have genetics testing    4-  Cr runs 1.5- Dr Joe Daily- avoid NSAIDS    Plan:  Mrs Sanford is doing well today.    We reviewed her interval history, imaging, imaging reports and examination together today.  There is no evidence of disease or recurrence on her examination.  We discussed that the right axillary discomfort  could be due to nerve discomfort or to scarring.  I recommended that she see physical therapy for some deep tissue massage.  She said she had a physical therapist preference and that she would call them herself.  I have asked her to continue her self exam and to call us in the future again if she has any additional concerns.      Oliva Ram MD        We have spent 20 minutes in visit today, 14 in face to face .      Next Appointment:  Return for any future concerns.      EMR Dragon/transcription disclaimer:    Much of this encounter note is an electronic transcription/translocation of spoken language to printed text.  The electronic translation of spoken language may permit erroneous, or at times, nonsensical words or phrases to be inadvertently transcribed.  Although I have reviewed the note from such areas, some may still exist.

## 2020-06-09 NOTE — PROGRESS NOTES
The ABCs of the Annual Wellness Visit  Subsequent Medicare Wellness Visit    Chief Complaint   Patient presents with   • medicare wellness     nno labs    • Hypertension     no bp meds since sat . told  to stop by francy elizondo - to discuss       Subjective   History of Present Illness:  Shell Sanford is a 69 y.o. female who presents for a Subsequent Medicare Wellness Visit.    HEALTH RISK ASSESSMENT    Recent Hospitalizations:  No hospitalization(s) within the last year.    Current Medical Providers:  Patient Care Team:  Bryce Soto MD as PCP - General  Bryce Soto MD as PCP - Family Medicine  [facility]  Oliva Ram MD as Surgeon (Breast Surgery)    Smoking Status:  Social History     Tobacco Use   Smoking Status Current Some Day Smoker   Smokeless Tobacco Never Used       Alcohol Consumption:  Social History     Substance and Sexual Activity   Alcohol Use No       Depression Screen:   PHQ-2/PHQ-9 Depression Screening 6/9/2020   Little interest or pleasure in doing things 0   Feeling down, depressed, or hopeless 0   Trouble falling or staying asleep, or sleeping too much -   Feeling tired or having little energy -   Poor appetite or overeating -   Feeling bad about yourself - or that you are a failure or have let yourself or your family down -   Trouble concentrating on things, such as reading the newspaper or watching television -   Moving or speaking so slowly that other people could have noticed. Or the opposite - being so fidgety or restless that you have been moving around a lot more than usual -   Thoughts that you would be better off dead, or of hurting yourself in some way -   Total Score 0       Fall Risk Screen:  STEADI Fall Risk Assessment was completed, and patient is at MODERATE risk for falls. Assessment completed on:6/9/2020    Health Habits and Functional and Cognitive Screening:  Functional & Cognitive Status 6/9/2020   Do you have  difficulty preparing food and eating? No   Do you have difficulty bathing yourself, getting dressed or grooming yourself? No   Do you have difficulty using the toilet? No   Do you have difficulty moving around from place to place? No   Do you have trouble with steps or getting out of a bed or a chair? No   Current Diet Well Balanced Diet   Dental Exam Up to date   Eye Exam Up to date   Exercise (times per week) 3 times per week   Current Exercise Activities Include Walking   Do you need help using the phone?  No   Are you deaf or do you have serious difficulty hearing?  No   Do you need help with transportation? No   Do you need help shopping? No   Do you need help preparing meals?  No   Do you need help with housework?  No   Do you need help with laundry? No   Do you need help taking your medications? No   Do you need help managing money? No   Do you ever drive or ride in a car without wearing a seat belt? No   Have you felt unusual stress, anger or loneliness in the last month? No   Who do you live with? Spouse   If you need help, do you have trouble finding someone available to you? Yes   Have you been bothered in the last four weeks by sexual problems? No   Do you have difficulty concentrating, remembering or making decisions? No         Does the patient have evidence of cognitive impairment? No    Asprin use counseling:Does not need ASA (and currently is not on it)    Age-appropriate Screening Schedule:  Refer to the list below for future screening recommendations based on patient's age, sex and/or medical conditions. Orders for these recommended tests are listed in the plan section. The patient has been provided with a written plan.    Health Maintenance   Topic Date Due   • URINE MICROALBUMIN  1950   • DIABETIC FOOT EXAM  07/27/2020 (Originally 3/5/2016)   • DIABETIC EYE EXAM  07/28/2020 (Originally 3/15/2020)   • INFLUENZA VACCINE  08/01/2020   • HEMOGLOBIN A1C  12/04/2020   • LIPID PANEL  06/04/2021    • TDAP/TD VACCINES (2 - Td) 01/01/2023   • COLONOSCOPY  11/23/2025   • PAP SMEAR  Discontinued   • ZOSTER VACCINE  Discontinued          The following portions of the patient's history were reviewed and updated as appropriate: allergies, current medications, past family history, past medical history, past social history, past surgical history and problem list.    Outpatient Medications Prior to Visit   Medication Sig Dispense Refill   • ALPRAZolam (XANAX) 0.5 MG tablet      • ARIPiprazole (ABILIFY) 2 MG tablet      • diclofenac (VOLTAREN) 1 % gel gel Apply 4 g topically 4 (four) times a day. 5 tube 5   • fenofibrate micronized (ANTARA) 130 MG capsule Take 1 capsule by mouth Every Morning Before Breakfast. 30 capsule 11   • hydrOXYzine (ATARAX) 25 MG tablet Take 1-2 tabs QHS prn 60 tablet 11   • lamoTRIgine (LaMICtal) 200 MG tablet      • levothyroxine (SYNTHROID, LEVOTHROID) 100 MCG tablet Take 1 tablet by mouth Daily. 30 tablet 11   • LINZESS 145 MCG capsule capsule      • venlafaxine XR (EFFEXOR-XR) 75 MG 24 hr capsule      • zolpidem CR (AMBIEN CR) 12.5 MG CR tablet      • atorvastatin (LIPITOR) 20 MG tablet Take 1 tablet by mouth Daily. 30 tablet 11   • clobetasol (TEMOVATE) 0.05 % cream Apply  topically Every 12 (Twelve) Hours. 15 g 1   • cyclobenzaprine (FLEXERIL) 10 MG tablet TAKE 1 TABLET BY MOUTH AT BEDTIME AS NEEDED FOR muscle SPASMS 30 tablet 0   • doxycycline (VIBRAMYCIN) 100 MG capsule Take 1 capsule by mouth 2 (Two) Times a Day. For infection 20 capsule 0   • hydroCHLOROthiazide (HYDRODIURIL) 25 MG tablet Take 1 tablet by mouth Daily. 30 tablet 11   • losartan (COZAAR) 100 MG tablet Take 1 tablet by mouth Daily. 30 tablet 11   • oxybutynin (DITROPAN) 5 MG tablet Take 1 tablet by mouth 3 (Three) Times a Day. 20 tablet 0     No facility-administered medications prior to visit.        Patient Active Problem List   Diagnosis   • Essential hypertension   • Hyperlipidemia   • Acquired hypothyroidism   •  "Bipolar disorder (CMS/[facility])   • Hypertensive chronic kidney disease with stage 1 through stage 4 chronic kidney disease, or unspecified chronic kidney disease   • Type 2 diabetes mellitus without complication, without long-term current use of insulin (CMS/[facility])       Advanced Care Planning:  ACP discussion was held with the patient during this visit. Patient does not have an advance directive, information provided.    Review of Systems    Compared to one year ago, the patient feels her physical health is the same.  Compared to one year ago, the patient feels her mental health is the same.    Reviewed chart for potential of high risk medication in the elderly: yes  Reviewed chart for potential of harmful drug interactions in the elderly:yes    Objective         Vitals:    06/09/20 0920   BP: 113/74   Pulse: 90   Resp: 18   Temp: 97.7 °F (36.5 °C)   TempSrc: Oral   Weight: 72.1 kg (159 lb)   Height: 160 cm (62.99\")   PainSc: 0-No pain       Body mass index is 28.17 kg/m².  Discussed the patient's BMI with her. The BMI is above average; BMI management plan is completed.    Physical Exam    Lab Results   Component Value Date     (H) 06/04/2020    CHLPL 193 06/04/2020    TRIG 169 (H) 06/04/2020    HDL 56 06/04/2020     (H) 06/04/2020    VLDL 33.8 06/04/2020    HGBA1C 6.60 (H) 06/04/2020        Assessment/Plan   Medicare Risks and Personalized Health Plan  CMS Preventative Services Quick Reference  Cardiovascular risk    The above risks/problems have been discussed with the patient.  Pertinent information has been shared with the patient in the After Visit Summary.  Follow up plans and orders are seen below in the Assessment/Plan Section.    Diagnoses and all orders for this visit:    1. Medicare annual wellness visit, subsequent (Primary)    2. Type 2 diabetes mellitus without complication, without long-term current use of insulin (CMS/[facility])  Comments:  New  Orders:  -     Hemoglobin A1c; Future  -     " MicroAlbumin, Urine, Random - Urine, Clean Catch; Future  -     Lipid Panel; Future  -     Basic Metabolic Panel; Future    3. Essential hypertension  -     Lipid Panel; Future  -     Basic Metabolic Panel; Future  -     losartan (COZAAR) 25 MG tablet; Take 1 tablet by mouth Daily.  Dispense: 30 tablet; Refill: 5    4. Pure hypercholesterolemia  -     Lipid Panel; Future  -     atorvastatin (LIPITOR) 40 MG tablet; Take 1 tablet by mouth Daily.  Dispense: 30 tablet; Refill: 5    5. Acquired hypothyroidism    Other orders  -     Cancel: atorvastatin (LIPITOR) 40 MG tablet; Take 1 tablet by mouth Daily.  Dispense: 30 tablet; Refill: 5      Follow Up:  Return in about 3 months (around 9/9/2020) for Recheck.     An After Visit Summary and PPPS were given to the patient.

## 2020-06-09 NOTE — PATIENT INSTRUCTIONS
Medicare Wellness  Personal Prevention Plan of Service     Date of Office Visit:  2020  Encounter Provider:  Bryce Soto MD  Place of Service:  [facility]  Patient Name: Shell Sanford  :  1950    As part of the Medicare Wellness portion of your visit today, we are providing you with this personalized preventive plan of services (PPPS). This plan is based upon recommendations of the [place] Preventive Services Task Force (USPSTF) and the Advisory Committee on Immunization Practices (ACIP).    This lists the preventive care services that should be considered, and provides dates of when you are due. Items listed as completed are up-to-date and do not require any further intervention.    Health Maintenance   Topic Date Due   • URINE MICROALBUMIN  1950   • DIABETIC FOOT EXAM  2020 (Originally 3/5/2016)   • DIABETIC EYE EXAM  2020 (Originally 3/15/2020)   • INFLUENZA VACCINE  2020   • HEMOGLOBIN A1C  2020   • LIPID PANEL  2021   • MEDICARE ANNUAL WELLNESS  2021   • TDAP/TD VACCINES (2 - Td) 2023   • COLONOSCOPY  2025   • Pneumococcal Vaccine Once at 65 Years Old  Completed   • HEPATITIS C SCREENING  Discontinued   • PAP SMEAR  Discontinued   • ZOSTER VACCINE  Discontinued       No orders of the defined types were placed in this encounter.      Return in about 3 months (around 2020) for Recheck.

## 2020-06-09 NOTE — TELEPHONE ENCOUNTER
I called to let her know that Dr. Ram would still recommend that her sister have genetic testing due to her young age at diagnosis.    I had to leave patient a message.

## 2020-06-09 NOTE — PROGRESS NOTES
Subjective   Shell Sanford is a 69 y.o. female.     History of Present Illness     Chief Complaint:   Chief Complaint   Patient presents with   • medicare wellness     nno labs    • Hypertension     no bp meds since sat . told  to stop by francy elizondo - to discuss       Shell Sanford 69 y.o. female who presents today for Medical Management of the below listed issues and medication refills.  she has a problem list of   Patient Active Problem List   Diagnosis   • Essential hypertension   • Hyperlipidemia   • Acquired hypothyroidism   • Bipolar disorder (CMS/[facility])   • Hypertensive chronic kidney disease with stage 1 through stage 4 chronic kidney disease, or unspecified chronic kidney disease   • Type 2 diabetes mellitus without complication, without long-term current use of insulin (CMS/[facility])   .  Since the last visit, she has overall felt well, although pt called the on call service and talked with Francy. BPs had been running way too low, causing sx. She was asked to hold the BP meds and has since felt better.   she has been compliant with   Current Outpatient Medications:   •  ALPRAZolam (XANAX) 0.5 MG tablet, , Disp: , Rfl:   •  ARIPiprazole (ABILIFY) 2 MG tablet, , Disp: , Rfl:   •  atorvastatin (LIPITOR) 40 MG tablet, Take 1 tablet by mouth Daily., Disp: 30 tablet, Rfl: 5  •  diclofenac (VOLTAREN) 1 % gel gel, Apply 4 g topically 4 (four) times a day., Disp: 5 tube, Rfl: 5  •  fenofibrate micronized (ANTARA) 130 MG capsule, Take 1 capsule by mouth Every Morning Before Breakfast., Disp: 30 capsule, Rfl: 11  •  hydrOXYzine (ATARAX) 25 MG tablet, Take 1-2 tabs QHS prn, Disp: 60 tablet, Rfl: 11  •  lamoTRIgine (LaMICtal) 200 MG tablet, , Disp: , Rfl:   •  levothyroxine (SYNTHROID, LEVOTHROID) 100 MCG tablet, Take 1 tablet by mouth Daily., Disp: 30 tablet, Rfl: 11  •  LINZESS 145 MCG capsule capsule, , Disp: , Rfl:   •  losartan (COZAAR) 25 MG tablet, Take 1 tablet by mouth Daily., Disp: 30 tablet, Rfl: 5  •  venlafaxine  "XR (EFFEXOR-XR) 75 MG 24 hr capsule, , Disp: , Rfl:   •  zolpidem CR (AMBIEN CR) 12.5 MG CR tablet, , Disp: , Rfl: .  she denies medication side effects.    All of the other chronic condition(s) listed above are stable w/o issues.    /74   Pulse 90   Temp 97.7 °F (36.5 °C) (Oral)   Resp 18   Ht 160 cm (62.99\")   Wt 72.1 kg (159 lb)   LMP  (LMP Unknown)   BMI 28.17 kg/m²     Results for orders placed or performed in visit on 06/03/20   Comprehensive metabolic panel   Result Value Ref Range    Glucose 116 (H) 65 - 99 mg/dL    BUN 31 (H) 8 - 23 mg/dL    Creatinine 1.36 (H) 0.57 - 1.00 mg/dL    eGFR Non African Am 38 (L) >60 mL/min/1.73    eGFR  Am 47 (L) >60 mL/min/1.73    BUN/Creatinine Ratio 22.8 7.0 - 25.0    Sodium 136 136 - 145 mmol/L    Potassium 4.5 3.5 - 5.2 mmol/L    Chloride 98 98 - 107 mmol/L    Total CO2 25.5 22.0 - 29.0 mmol/L    Calcium 10.1 8.6 - 10.5 mg/dL    Total Protein 7.5 6.0 - 8.5 g/dL    Albumin 4.90 3.50 - 5.20 g/dL    Globulin 2.6 gm/dL    A/G Ratio 1.9 g/dL    Total Bilirubin 0.5 0.2 - 1.2 mg/dL    Alkaline Phosphatase 70 39 - 117 U/L    AST (SGOT) 34 (H) 1 - 32 U/L    ALT (SGPT) 33 1 - 33 U/L   Lipid panel   Result Value Ref Range    Total Cholesterol 193 0 - 200 mg/dL    Triglycerides 169 (H) 0 - 150 mg/dL    HDL Cholesterol 56 40 - 60 mg/dL    VLDL Cholesterol 33.8 5 - 40 mg/dL    LDL Cholesterol  103 (H) 0 - 100 mg/dL   TSH   Result Value Ref Range    TSH 0.311 0.270 - 4.200 uIU/mL   T4, Free   Result Value Ref Range    Free T4 1.72 (H) 0.93 - 1.70 ng/dL   T3, Free   Result Value Ref Range    T3, Free 3.1 2.0 - 4.4 pg/mL   Hemoglobin A1c   Result Value Ref Range    Hemoglobin A1C 6.60 (H) 4.80 - 5.60 %   CBC and Differential   Result Value Ref Range    WBC 9.65 3.40 - 10.80 10*3/mm3    RBC 4.40 3.77 - 5.28 10*6/mm3    Hemoglobin 12.9 12.0 - 15.9 g/dL    Hematocrit 38.1 34.0 - 46.6 %    MCV 86.6 79.0 - 97.0 fL    MCH 29.3 26.6 - 33.0 pg    MCHC 33.9 31.5 - 35.7 g/dL    " RDW 12.9 12.3 - 15.4 %    Platelets 392 140 - 450 10*3/mm3    Neutrophil Rel % 53.3 42.7 - 76.0 %    Lymphocyte Rel % 35.5 19.6 - 45.3 %    Monocyte Rel % 7.6 5.0 - 12.0 %    Eosinophil Rel % 2.7 0.3 - 6.2 %    Basophil Rel % 0.6 0.0 - 1.5 %    Neutrophils Absolute 5.14 1.70 - 7.00 10*3/mm3    Lymphocytes Absolute 3.43 (H) 0.70 - 3.10 10*3/mm3    Monocytes Absolute 0.73 0.10 - 0.90 10*3/mm3    Eosinophils Absolute 0.26 0.00 - 0.40 10*3/mm3    Basophils Absolute 0.06 0.00 - 0.20 10*3/mm3    Immature Granulocyte Rel % 0.3 0.0 - 0.5 %    Immature Grans Absolute 0.03 0.00 - 0.05 10*3/mm3    nRBC 0.0 0.0 - 0.2 /100 WBC           The following portions of the patient's history were reviewed and updated as appropriate: allergies, current medications, past family history, past medical history, past social history, past surgical history and problem list.    Review of Systems   Constitutional: Negative for activity change, chills, fatigue and fever.   Respiratory: Negative for cough and chest tightness.    Cardiovascular: Negative for chest pain and palpitations.   Gastrointestinal: Negative for abdominal pain and nausea.   Endocrine: Negative for cold intolerance.   Psychiatric/Behavioral: Negative for behavioral problems and dysphoric mood.       Objective   Physical Exam   Constitutional: She appears well-developed and well-nourished.   Neck: Neck supple. No thyromegaly present.   Cardiovascular: Normal rate and regular rhythm.   No murmur heard.  Pulmonary/Chest: Effort normal and breath sounds normal.   Abdominal: Bowel sounds are normal. There is no tenderness.   Neurological: She is alert.   Psychiatric: She has a normal mood and affect. Her behavior is normal.   Nursing note and vitals reviewed.      Assessment/Plan   Shell was seen today for medicare wellness and hypertension.    Diagnoses and all orders for this visit:    Medicare annual wellness visit, subsequent    Type 2 diabetes mellitus without complication,  without long-term current use of insulin (CMS/[facility])  Comments:  New  Orders:  -     Hemoglobin A1c; Future  -     MicroAlbumin, Urine, Random - Urine, Clean Catch; Future  -     Lipid Panel; Future  -     Basic Metabolic Panel; Future    Essential hypertension  -     Lipid Panel; Future  -     Basic Metabolic Panel; Future  -     losartan (COZAAR) 25 MG tablet; Take 1 tablet by mouth Daily.    Pure hypercholesterolemia  -     Lipid Panel; Future  -     atorvastatin (LIPITOR) 40 MG tablet; Take 1 tablet by mouth Daily.    Acquired hypothyroidism    Meds recently refilled. Pt to continue same.  Diet/exercise/weight management to treat the glucose discussed.

## 2020-08-09 ENCOUNTER — RESULTS ENCOUNTER (OUTPATIENT)
Dept: FAMILY MEDICINE CLINIC | Facility: CLINIC | Age: 70
End: 2020-08-09

## 2020-08-09 DIAGNOSIS — E78.00 PURE HYPERCHOLESTEROLEMIA: ICD-10-CM

## 2020-08-09 DIAGNOSIS — I10 ESSENTIAL HYPERTENSION: ICD-10-CM

## 2020-08-09 DIAGNOSIS — E11.9 TYPE 2 DIABETES MELLITUS WITHOUT COMPLICATION, WITHOUT LONG-TERM CURRENT USE OF INSULIN (HCC): ICD-10-CM

## 2020-09-05 LAB
BUN SERPL-MCNC: 19 MG/DL (ref 8–23)
BUN/CREAT SERPL: 16.5 (ref 7–25)
CALCIUM SERPL-MCNC: 9.2 MG/DL (ref 8.6–10.5)
CHLORIDE SERPL-SCNC: 103 MMOL/L (ref 98–107)
CHOLEST SERPL-MCNC: 179 MG/DL (ref 0–200)
CO2 SERPL-SCNC: 24.6 MMOL/L (ref 22–29)
CREAT SERPL-MCNC: 1.15 MG/DL (ref 0.57–1)
GLUCOSE SERPL-MCNC: 87 MG/DL (ref 65–99)
HBA1C MFR BLD: 6.2 % (ref 4.8–5.6)
HDLC SERPL-MCNC: 61 MG/DL (ref 40–60)
LDLC SERPL CALC-MCNC: 96 MG/DL (ref 0–100)
MICROALBUMIN UR-MCNC: 26.7 UG/ML
POTASSIUM SERPL-SCNC: 4.5 MMOL/L (ref 3.5–5.2)
SODIUM SERPL-SCNC: 140 MMOL/L (ref 136–145)
TRIGL SERPL-MCNC: 111 MG/DL (ref 0–150)
VLDLC SERPL CALC-MCNC: 22.2 MG/DL

## 2020-09-09 ENCOUNTER — OFFICE VISIT (OUTPATIENT)
Dept: FAMILY MEDICINE CLINIC | Facility: CLINIC | Age: 70
End: 2020-09-09

## 2020-09-09 VITALS
SYSTOLIC BLOOD PRESSURE: 102 MMHG | BODY MASS INDEX: 27.64 KG/M2 | WEIGHT: 156 LBS | RESPIRATION RATE: 16 BRPM | HEART RATE: 88 BPM | HEIGHT: 63 IN | TEMPERATURE: 96.9 F | DIASTOLIC BLOOD PRESSURE: 66 MMHG

## 2020-09-09 DIAGNOSIS — E11.9 TYPE 2 DIABETES MELLITUS WITHOUT COMPLICATION, WITHOUT LONG-TERM CURRENT USE OF INSULIN (HCC): Primary | ICD-10-CM

## 2020-09-09 DIAGNOSIS — I10 ESSENTIAL HYPERTENSION: ICD-10-CM

## 2020-09-09 DIAGNOSIS — E78.2 MIXED HYPERLIPIDEMIA: ICD-10-CM

## 2020-09-09 DIAGNOSIS — F51.01 PRIMARY INSOMNIA: ICD-10-CM

## 2020-09-09 PROCEDURE — 99213 OFFICE O/P EST LOW 20 MIN: CPT | Performed by: FAMILY MEDICINE

## 2020-09-09 RX ORDER — HYDROXYZINE HYDROCHLORIDE 25 MG/1
TABLET, FILM COATED ORAL
Qty: 60 TABLET | Refills: 11 | Status: SHIPPED | OUTPATIENT
Start: 2020-09-09 | End: 2021-03-18

## 2020-09-09 NOTE — PROGRESS NOTES
Subjective   Shell Sanford is a 69 y.o. female.     History of Present Illness     Chief Complaint:   Chief Complaint   Patient presents with   • Diabetes     follow up labs  diabetic foot exam due    • Insomnia     med [facility]   - [facility] pharm       Shell Sanford 69 y.o. female who presents today for Medical Management of the below listed issues and medication refills.  she has a problem list of   Patient Active Problem List   Diagnosis   • Essential hypertension   • Hyperlipidemia   • Acquired hypothyroidism   • Bipolar disorder (CMS/[facility])   • Hypertensive chronic kidney disease with stage 1 through stage 4 chronic kidney disease, or unspecified chronic kidney disease   • Type 2 diabetes mellitus without complication, without long-term current use of insulin (CMS/[facility])   .  Since the last visit, she has overall felt well.  she has been compliant with   Current Outpatient Medications:   •  hydrOXYzine (ATARAX) 25 MG tablet, Take 1-2 tabs QHS prn, Disp: 60 tablet, Rfl: 11  •  ALPRAZolam (XANAX) 0.5 MG tablet, , Disp: , Rfl:   •  ARIPiprazole (ABILIFY) 2 MG tablet, , Disp: , Rfl:   •  atorvastatin (LIPITOR) 40 MG tablet, Take 1 tablet by mouth Daily., Disp: 30 tablet, Rfl: 5  •  diclofenac (VOLTAREN) 1 % gel gel, Apply 4 g topically 4 (four) times a day., Disp: 5 tube, Rfl: 5  •  fenofibrate micronized (ANTARA) 130 MG capsule, Take 1 capsule by mouth Every Morning Before Breakfast., Disp: 30 capsule, Rfl: 11  •  lamoTRIgine (LaMICtal) 200 MG tablet, , Disp: , Rfl:   •  levothyroxine (SYNTHROID, LEVOTHROID) 100 MCG tablet, Take 1 tablet by mouth Daily., Disp: 30 tablet, Rfl: 11  •  LINZESS 145 MCG capsule capsule, , Disp: , Rfl:   •  losartan (COZAAR) 25 MG tablet, Take 1 tablet by mouth Daily., Disp: 30 tablet, Rfl: 5  •  venlafaxine XR (EFFEXOR-XR) 75 MG 24 hr capsule, , Disp: , Rfl:   •  zolpidem CR (AMBIEN CR) 12.5 MG CR tablet, , Disp: , Rfl: .  she denies medication side effects.    All of the other chronic  "condition(s) listed above are stable w/o issues.    /66   Pulse 88   Temp 96.9 °F (36.1 °C) (Oral)   Resp 16   Ht 160 cm (63\")   Wt 70.8 kg (156 lb)   LMP  (LMP Unknown)   BMI 27.63 kg/m²     Results for orders placed or performed in visit on 08/09/20   Hemoglobin A1c   Result Value Ref Range    Hemoglobin A1C 6.20 (H) 4.80 - 5.60 %   MicroAlbumin, Urine, Random - Urine, Clean Catch   Result Value Ref Range    Microalbumin, Urine 26.7 Not Estab. ug/mL   Lipid Panel   Result Value Ref Range    Total Cholesterol 179 0 - 200 mg/dL    Triglycerides 111 0 - 150 mg/dL    HDL Cholesterol 61 (H) 40 - 60 mg/dL    VLDL Cholesterol Jonnathan 22.2 mg/dL    LDL Chol Calc (NIH) 96 0 - 100 mg/dL   Basic Metabolic Panel   Result Value Ref Range    Glucose 87 65 - 99 mg/dL    BUN 19 8 - 23 mg/dL    Creatinine 1.15 (H) 0.57 - 1.00 mg/dL    eGFR Non African Am 47 (L) >60 mL/min/1.73    eGFR African Am 57 (L) >60 mL/min/1.73    BUN/Creatinine Ratio 16.5 7.0 - 25.0    Sodium 140 136 - 145 mmol/L    Potassium 4.5 3.5 - 5.2 mmol/L    Chloride 103 98 - 107 mmol/L    Total CO2 24.6 22.0 - 29.0 mmol/L    Calcium 9.2 8.6 - 10.5 mg/dL           The following portions of the patient's history were reviewed and updated as appropriate: allergies, current medications, past family history, past medical history, past social history, past surgical history and problem list.    Review of Systems   Constitutional: Negative for activity change, chills, fatigue and fever.   Respiratory: Negative for cough and chest tightness.    Cardiovascular: Negative for chest pain and palpitations.   Gastrointestinal: Negative for abdominal pain and nausea.   Endocrine: Negative for cold intolerance.   Psychiatric/Behavioral: Negative for behavioral problems and dysphoric mood.       Objective   Physical Exam   Constitutional: She appears well-developed and well-nourished.   Neck: Neck supple. No thyromegaly present.   Cardiovascular: Normal rate and regular " rhythm.   No murmur heard.  Pulmonary/Chest: Effort normal and breath sounds normal.   Abdominal: Bowel sounds are normal. There is no tenderness.   Neurological: She is alert.   Psychiatric: She has a normal mood and affect. Her behavior is normal.   Nursing note and vitals reviewed.  Labs reviewed with pt today during visit. All questions answered.      Assessment/Plan   Shell was seen today for diabetes and insomnia.    Diagnoses and all orders for this visit:    Type 2 diabetes mellitus without complication, without long-term current use of insulin (CMS/[facility])    Primary insomnia  -     hydrOXYzine (ATARAX) 25 MG tablet; Take 1-2 tabs QHS prn    Mixed hyperlipidemia    Essential hypertension

## 2020-11-12 DIAGNOSIS — E78.00 PURE HYPERCHOLESTEROLEMIA: ICD-10-CM

## 2020-11-12 DIAGNOSIS — I10 ESSENTIAL HYPERTENSION: ICD-10-CM

## 2020-11-12 RX ORDER — LOSARTAN POTASSIUM 25 MG/1
25 TABLET ORAL DAILY
Qty: 30 TABLET | Refills: 5 | Status: SHIPPED | OUTPATIENT
Start: 2020-11-12 | End: 2021-04-05 | Stop reason: SDUPTHER

## 2020-11-12 RX ORDER — ATORVASTATIN CALCIUM 40 MG/1
40 TABLET, FILM COATED ORAL DAILY
Qty: 30 TABLET | Refills: 5 | Status: SHIPPED | OUTPATIENT
Start: 2020-11-12 | End: 2021-04-05 | Stop reason: SDUPTHER

## 2021-01-14 DIAGNOSIS — L65.9 HAIR LOSS: Primary | ICD-10-CM

## 2021-01-18 ENCOUNTER — TELEPHONE (OUTPATIENT)
Dept: FAMILY MEDICINE CLINIC | Facility: CLINIC | Age: 71
End: 2021-01-18

## 2021-01-18 NOTE — TELEPHONE ENCOUNTER
Patient is wanting to know if the dermatologist that Dr. Soto referred her to is going to call her to make the appt or if she has to call them.

## 2021-02-02 ENCOUNTER — TELEPHONE (OUTPATIENT)
Dept: GASTROENTEROLOGY | Facility: CLINIC | Age: 71
End: 2021-02-02

## 2021-02-02 NOTE — TELEPHONE ENCOUNTER
Last scope 11/23/2015-- personal hx of polyps--father hx of colon ca-- brother hx of crohns-- no ASA or blood thinners-- medications:     ALPRAZolam (XANAX) 0.5 MG tablet     ARIPiprazole (ABILIFY) 2 MG tablet     atorvastatin (LIPITOR) 40 MG tablet     fenofibrate micronized (ANTARA) 130 MG capsule     lamoTRIgine (LaMICtal) 200 MG tablet     levothyroxine (SYNTHROID, LEVOTHROID) 100 MCG tablet     losartan (COZAAR) 25 MG tablet     venlafaxine XR (EFFEXOR-XR) 75 MG 24 hr capsule      OA form and last scope scanned into media

## 2021-02-16 ENCOUNTER — PREP FOR SURGERY (OUTPATIENT)
Dept: OTHER | Facility: HOSPITAL | Age: 71
End: 2021-02-16

## 2021-02-16 DIAGNOSIS — Z80.0 FAMILY HISTORY OF COLON CANCER: Primary | ICD-10-CM

## 2021-03-01 ENCOUNTER — TELEPHONE (OUTPATIENT)
Dept: GASTROENTEROLOGY | Facility: CLINIC | Age: 71
End: 2021-03-01

## 2021-03-01 PROBLEM — Z80.0 FAMILY HISTORY OF COLON CANCER: Status: ACTIVE | Noted: 2021-03-01

## 2021-03-02 ENCOUNTER — TRANSCRIBE ORDERS (OUTPATIENT)
Dept: SLEEP MEDICINE | Facility: HOSPITAL | Age: 71
End: 2021-03-02

## 2021-03-02 DIAGNOSIS — Z01.818 OTHER SPECIFIED PRE-OPERATIVE EXAMINATION: Primary | ICD-10-CM

## 2021-03-11 ENCOUNTER — OFFICE VISIT (OUTPATIENT)
Dept: FAMILY MEDICINE CLINIC | Facility: CLINIC | Age: 71
End: 2021-03-11

## 2021-03-11 VITALS
HEIGHT: 63 IN | TEMPERATURE: 97.8 F | HEART RATE: 84 BPM | SYSTOLIC BLOOD PRESSURE: 110 MMHG | OXYGEN SATURATION: 96 % | BODY MASS INDEX: 28 KG/M2 | DIASTOLIC BLOOD PRESSURE: 60 MMHG | RESPIRATION RATE: 16 BRPM | WEIGHT: 158 LBS

## 2021-03-11 DIAGNOSIS — L30.9 DERMATITIS: Primary | ICD-10-CM

## 2021-03-11 PROCEDURE — 99213 OFFICE O/P EST LOW 20 MIN: CPT | Performed by: NURSE PRACTITIONER

## 2021-03-11 RX ORDER — DESONIDE 0.5 MG/G
CREAM TOPICAL 2 TIMES DAILY
Qty: 15 G | Refills: 0 | Status: SHIPPED | OUTPATIENT
Start: 2021-03-11 | End: 2021-06-30

## 2021-03-11 NOTE — PROGRESS NOTES
Subjective   Shell Sanford is a 70 y.o. female.     History of Present Illness   Shell Sanford female 70 y.o. who presents for evaluation of vaginal symptoms of burning and itching. Symptoms have been present for a few days. Symptoms include bumps.  Prior therapies tried: none.   Menstrual pattern: bleeding none.   STI Risk: Very low risk of STD exposure.      The following portions of the patient's history were reviewed and updated as appropriate: allergies, current medications, past family history, past medical history, past social history, past surgical history and problem list.    Review of Systems   Constitutional: Negative for chills, fatigue, fever and unexpected weight change.   Respiratory: Negative for shortness of breath.    Cardiovascular: Negative for chest pain and palpitations.   Genitourinary: Negative for decreased urine volume, difficulty urinating, dyspareunia, dysuria, enuresis, flank pain, frequency, genital sores, hematuria, menstrual problem, pelvic pain, urgency, vaginal bleeding, vaginal discharge and vaginal pain.   Musculoskeletal: Negative for back pain.   Psychiatric/Behavioral: Negative for behavioral problems.       Objective   Physical Exam  Vitals and nursing note reviewed.   Constitutional:       Appearance: Normal appearance. She is well-developed.   Cardiovascular:      Rate and Rhythm: Normal rate.   Pulmonary:      Effort: Pulmonary effort is normal.   Genitourinary:     Labia:         Right: Rash present. No injury.           Comments: Small papular eruption to right external labia majora.  No significant erythema noted.  No vesicular or pustular lesions noted.    Lymphadenopathy:      Lower Body: No right inguinal adenopathy.   Neurological:      Mental Status: She is alert and oriented to person, place, and time.   Psychiatric:         Mood and Affect: Mood normal.         Behavior: Behavior normal.         Thought Content: Thought content normal.         Judgment: Judgment  normal.         Assessment/Plan   Diagnoses and all orders for this visit:    1. Dermatitis (Primary)  -     desonide (DesOwen) 0.05 % cream; Apply  topically to the appropriate area as directed 2 (Two) Times a Day.  Dispense: 15 g; Refill: 0

## 2021-03-17 ENCOUNTER — LAB (OUTPATIENT)
Dept: LAB | Facility: HOSPITAL | Age: 71
End: 2021-03-17

## 2021-03-17 DIAGNOSIS — Z01.818 OTHER SPECIFIED PRE-OPERATIVE EXAMINATION: ICD-10-CM

## 2021-03-17 PROCEDURE — C9803 HOPD COVID-19 SPEC COLLECT: HCPCS

## 2021-03-17 PROCEDURE — U0005 INFEC AGEN DETEC AMPLI PROBE: HCPCS

## 2021-03-17 PROCEDURE — U0004 COV-19 TEST NON-CDC HGH THRU: HCPCS

## 2021-03-18 DIAGNOSIS — E03.9 ACQUIRED HYPOTHYROIDISM: ICD-10-CM

## 2021-03-18 LAB — SARS-COV-2 RNA RESP QL NAA+PROBE: NOT DETECTED

## 2021-03-18 RX ORDER — LEVOTHYROXINE SODIUM 0.1 MG/1
100 TABLET ORAL DAILY
Qty: 30 TABLET | Refills: 11 | OUTPATIENT
Start: 2021-03-18

## 2021-03-19 ENCOUNTER — ANESTHESIA EVENT (OUTPATIENT)
Dept: GASTROENTEROLOGY | Facility: HOSPITAL | Age: 71
End: 2021-03-19

## 2021-03-19 ENCOUNTER — ANESTHESIA (OUTPATIENT)
Dept: GASTROENTEROLOGY | Facility: HOSPITAL | Age: 71
End: 2021-03-19

## 2021-03-19 ENCOUNTER — HOSPITAL ENCOUNTER (OUTPATIENT)
Facility: HOSPITAL | Age: 71
Setting detail: HOSPITAL OUTPATIENT SURGERY
Discharge: HOME | End: 2021-03-19
Attending: INTERNAL MEDICINE | Admitting: INTERNAL MEDICINE

## 2021-03-19 VITALS
RESPIRATION RATE: 14 BRPM | WEIGHT: 154.8 LBS | TEMPERATURE: 98.2 F | SYSTOLIC BLOOD PRESSURE: 115 MMHG | OXYGEN SATURATION: 95 % | DIASTOLIC BLOOD PRESSURE: 72 MMHG | BODY MASS INDEX: 27.43 KG/M2 | HEIGHT: 63 IN | HEART RATE: 75 BPM

## 2021-03-19 DIAGNOSIS — Z80.0 FAMILY HISTORY OF COLON CANCER: ICD-10-CM

## 2021-03-19 PROCEDURE — 25010000002 PROPOFOL 10 MG/ML EMULSION: Performed by: ANESTHESIOLOGY

## 2021-03-19 PROCEDURE — 45385 COLONOSCOPY W/LESION REMOVAL: CPT | Performed by: INTERNAL MEDICINE

## 2021-03-19 PROCEDURE — 25010000002 PHENYLEPHRINE PER 1 ML: Performed by: ANESTHESIOLOGY

## 2021-03-19 PROCEDURE — S0260 H&P FOR SURGERY: HCPCS | Performed by: INTERNAL MEDICINE

## 2021-03-19 PROCEDURE — 88305 TISSUE EXAM BY PATHOLOGIST: CPT | Performed by: INTERNAL MEDICINE

## 2021-03-19 RX ORDER — PROPOFOL 10 MG/ML
VIAL (ML) INTRAVENOUS CONTINUOUS PRN
Status: DISCONTINUED | OUTPATIENT
Start: 2021-03-19 | End: 2021-03-19 | Stop reason: SURG

## 2021-03-19 RX ORDER — LIDOCAINE HYDROCHLORIDE 10 MG/ML
0.5 INJECTION, SOLUTION INFILTRATION; PERINEURAL ONCE AS NEEDED
Status: DISCONTINUED | OUTPATIENT
Start: 2021-03-19 | End: 2021-03-19 | Stop reason: HOSPADM

## 2021-03-19 RX ORDER — SODIUM CHLORIDE, SODIUM LACTATE, POTASSIUM CHLORIDE, CALCIUM CHLORIDE 600; 310; 30; 20 MG/100ML; MG/100ML; MG/100ML; MG/100ML
1000 INJECTION, SOLUTION INTRAVENOUS CONTINUOUS
Status: DISCONTINUED | OUTPATIENT
Start: 2021-03-19 | End: 2021-03-19 | Stop reason: HOSPADM

## 2021-03-19 RX ORDER — LIDOCAINE HYDROCHLORIDE 20 MG/ML
INJECTION, SOLUTION INFILTRATION; PERINEURAL AS NEEDED
Status: DISCONTINUED | OUTPATIENT
Start: 2021-03-19 | End: 2021-03-19 | Stop reason: SURG

## 2021-03-19 RX ORDER — PROPOFOL 10 MG/ML
VIAL (ML) INTRAVENOUS AS NEEDED
Status: DISCONTINUED | OUTPATIENT
Start: 2021-03-19 | End: 2021-03-19 | Stop reason: SURG

## 2021-03-19 RX ORDER — SODIUM CHLORIDE 0.9 % (FLUSH) 0.9 %
10 SYRINGE (ML) INJECTION AS NEEDED
Status: DISCONTINUED | OUTPATIENT
Start: 2021-03-19 | End: 2021-03-19 | Stop reason: HOSPADM

## 2021-03-19 RX ADMIN — PROPOFOL 160 MCG/KG/MIN: 10 INJECTION, EMULSION INTRAVENOUS at 11:03

## 2021-03-19 RX ADMIN — SODIUM CHLORIDE, POTASSIUM CHLORIDE, SODIUM LACTATE AND CALCIUM CHLORIDE 1000 ML: 600; 310; 30; 20 INJECTION, SOLUTION INTRAVENOUS at 10:47

## 2021-03-19 RX ADMIN — PHENYLEPHRINE HYDROCHLORIDE 100 MCG: 10 INJECTION INTRAVENOUS at 11:05

## 2021-03-19 RX ADMIN — PHENYLEPHRINE HYDROCHLORIDE 100 MCG: 10 INJECTION INTRAVENOUS at 11:16

## 2021-03-19 RX ADMIN — PHENYLEPHRINE HYDROCHLORIDE 100 MCG: 10 INJECTION INTRAVENOUS at 11:11

## 2021-03-19 RX ADMIN — PHENYLEPHRINE HYDROCHLORIDE 100 MCG: 10 INJECTION INTRAVENOUS at 11:08

## 2021-03-19 RX ADMIN — PROPOFOL 100 MG: 10 INJECTION, EMULSION INTRAVENOUS at 11:03

## 2021-03-19 RX ADMIN — LIDOCAINE HYDROCHLORIDE 60 MG: 20 INJECTION, SOLUTION INFILTRATION; PERINEURAL at 11:03

## 2021-03-19 NOTE — BRIEF OP NOTE
COLONOSCOPY  Progress Note    Shell Sanford  3/19/2021    Pre-op Diagnosis:   Family history of colon cancer [Z80.0]       Post-Op Diagnosis Codes:     * Family history of colon cancer [Z80.0]     * Colon polyp [K63.5]     * Diverticulosis [K57.90]    Procedure/CPT® Codes:        Procedure(s):  COLONOSCOPY TO CECUM AND TI WITH COLD SNARE POLYPECTOMY    Surgeon(s):  Sushil Leiva MD    Anesthesia: Monitored Anesthesia Care    Staff:   Endo Technician: Majo Fox  Endo Nurse: Africa Solis RN         Estimated Blood Loss: minimal    Urine Voided: * No values recorded between 3/19/2021 10:58 AM and 3/19/2021 11:17 AM *    Specimens:                Specimens     ID Source Type Tests Collected By Collected At Frozen?    A Large Intestine, Left / Descending Colon Polyp · TISSUE PATHOLOGY EXAM   Sushil Leiva MD 3/19/21 1116     Description: DESCENDING COLON POLYP    This specimen was not marked as sent.                Drains: * No LDAs found *    Findings:   Colonoscopy to the terminal ileum with normal ileum mucosa.  Descending colon polyp removed cold snare sigmoid diverticulosis noted as well.    Complications: None          Sushil Leiva MD     Date: 3/19/2021  Time: 11:19 EDT

## 2021-03-19 NOTE — ANESTHESIA PREPROCEDURE EVALUATION
Anesthesia Evaluation     Patient summary reviewed   NPO Solid Status: > 8 hours  NPO Liquid Status: > 2 hours           Airway   Mallampati: II  TM distance: >3 FB  Neck ROM: full  Dental      Pulmonary     breath sounds clear to auscultation  (+) a smoker Current Smoked day of surgery, sleep apnea,   (-) shortness of breath  Cardiovascular   Exercise tolerance: good (4-7 METS)    Rhythm: regular  Rate: normal    (+) hypertension, hyperlipidemia,   (-) angina, ANGELES      Neuro/Psych  (+) psychiatric history Anxiety and Bipolar,     GI/Hepatic/Renal/Endo    (+)  GERD,  renal disease, diabetes mellitus,     Musculoskeletal     Abdominal    Substance History      OB/GYN          Other                      Anesthesia Plan    ASA 3     MAC   (MAC anesthesia discussed with patient and/or patient representative. Risks (including but not limited to intra-op awareness), benefits, and alternatives were discussed. Understanding was voiced with an agreement to proceed with a MAC technique and General as a backup option.   )  intravenous induction     Anesthetic plan, all risks, benefits, and alternatives have been provided, discussed and informed consent has been obtained with: patient.

## 2021-03-19 NOTE — H&P
[facility]  Pre Procedure History & Physical    Chief Complaint:   Family history colon cancer with personal history colon polyps    Subjective     HPI:   Patient 70-year-old female with history of hypertension, hyperlipidemia as well as hypothyroidism with colon polyp on last colonoscopy here for surveillance.  Patient with no GI complaints.    Past Medical History:   Past Medical History:   Diagnosis Date   • Anxiety    • Bipolar affect, depressed (CMS/HCC)    • Chronic constipation    • GERD (gastroesophageal reflux disease)    • H/O complete eye exam 01/01/2017   • History of breast cancer 2011   • History of gastric ulcer    • Hyperlipidemia    • Hypertension    • Hypothyroidism     HYPOTHYROIDISM   • Prediabetes     NO MEDS   • Sleep apnea     NO MACHINE, WEARS MOUTH NIGHTGUARD   • Upper abdominal pain        Past Surgical History:  Past Surgical History:   Procedure Laterality Date   • BREAST SURGERY     • COLONOSCOPY  11/2015   • HYSTERECTOMY     • MASTECTOMY Bilateral 2012    double       Family History:  Family History   Problem Relation Age of Onset   • Colon cancer Father 60   • Breast cancer Sister 39   • Malig Hyperthermia Neg Hx        Social History:   reports that she has been smoking cigarettes. She started smoking about 41 years ago. She has been smoking about 0.25 packs per day. She has never used smokeless tobacco. She reports that she does not drink alcohol and does not use drugs.    Medications:   Medications Prior to Admission   Medication Sig Dispense Refill Last Dose   • ALPRAZolam (XANAX) 0.5 MG tablet Take 0.5 mg by mouth As Needed.   3/18/2021 at Unknown time   • ARIPiprazole (ABILIFY) 2 MG tablet Take 2 mg by mouth Daily.   3/18/2021 at Unknown time   • atorvastatin (LIPITOR) 40 MG tablet TAKE 1 TABLET BY MOUTH DAILY 30 tablet 5 3/18/2021 at Unknown time   • desonide (DesOwen) 0.05 % cream Apply  topically to the appropriate area as directed 2 (Two) Times a Day.  "15 g 0 Past Month at Unknown time   • fenofibrate micronized (ANTARA) 130 MG capsule Take 1 capsule by mouth Every Morning Before Breakfast. 30 capsule 11 3/18/2021 at Unknown time   • lamoTRIgine (LaMICtal) 200 MG tablet Take 200 mg by mouth Daily.   3/18/2021 at Unknown time   • levothyroxine (SYNTHROID, LEVOTHROID) 100 MCG tablet Take 1 tablet by mouth Daily. 30 tablet 11 3/18/2021 at Unknown time   • losartan (COZAAR) 25 MG tablet TAKE 1 TABLET BY MOUTH DAILY 30 tablet 5 3/18/2021 at Unknown time   • venlafaxine XR (EFFEXOR-XR) 75 MG 24 hr capsule Take 225 mg by mouth Daily.   3/18/2021 at Unknown time       Allergies:  Keflet [cephalexin], Penicillins, and Neosporin [neomycin-polymyxin-gramicidin]    ROS:    Pertinent items are noted in HPI     Objective     Blood pressure 93/66, pulse 85, temperature 98.2 °F (36.8 °C), temperature source Oral, resp. rate 20, height 160 cm (63\"), weight 70.2 kg (154 lb 12.8 oz), SpO2 98 %, not currently breastfeeding.    Physical Exam   Constitutional: Pt is oriented to person, place, and time and well-developed, well-nourished, and in no distress.   Mouth/Throat: Oropharynx is clear and moist.   Neck: Normal range of motion.   Cardiovascular: Normal rate, regular rhythm and normal heart sounds.    Pulmonary/Chest: Effort normal and breath sounds normal.   Abdominal: Soft. Nontender  Skin: Skin is warm and dry.   Psychiatric: Mood, memory, affect and judgment normal.     Assessment/Plan     Diagnosis:  History of colon polyps  Family history of colon cancer    Anticipated Surgical Procedure:  Colonoscopy    The risks, benefits, and alternatives of this procedure have been discussed with the patient or the responsible party- the patient understands and agrees to proceed.                                                          "

## 2021-03-19 NOTE — ANESTHESIA POSTPROCEDURE EVALUATION
"Patient: Shell Sanford    Procedure Summary     Date: 03/19/21 Room / Location: [facility] ENDOSCOPY 5 /  [facility]    Anesthesia Start: 1058 Anesthesia Stop: 1122    Procedure: COLONOSCOPY TO CECUM AND TI WITH COLD SNARE POLYPECTOMY (N/A ) Diagnosis:       Family history of colon cancer      Colon polyp      Diverticulosis      (Family history of colon cancer [Z80.0])    Surgeons: Sushil Leiva MD Provider: Jude Carl MD    Anesthesia Type: MAC ASA Status: 3          Anesthesia Type: MAC    Vitals  Vitals Value Taken Time   BP     Temp     Pulse 78 03/19/21 1122   Resp 14 03/19/21 1122   SpO2 98 % 03/19/21 1122           Post Anesthesia Care and Evaluation    Patient location during evaluation: bedside  Patient participation: complete - patient participated  Level of consciousness: awake and alert  Pain management: adequate  Airway patency: patent  Anesthetic complications: No anesthetic complications    Cardiovascular status: acceptable  Respiratory status: acceptable  Hydration status: acceptable    Comments: BP 93/66 (BP Location: Left arm, Patient Position: Lying)   Pulse 78   Temp 36.8 °C (98.2 °F) (Oral)   Resp 14   Ht 160 cm (63\")   Wt 70.2 kg (154 lb 12.8 oz)   LMP  (LMP Unknown)   SpO2 98%   BMI 27.42 kg/m²       "

## 2021-03-22 LAB
CYTO UR: NORMAL
LAB AP CASE REPORT: NORMAL
PATH REPORT.FINAL DX SPEC: NORMAL
PATH REPORT.GROSS SPEC: NORMAL

## 2021-03-23 ENCOUNTER — TELEPHONE (OUTPATIENT)
Dept: FAMILY MEDICINE CLINIC | Facility: CLINIC | Age: 71
End: 2021-03-23

## 2021-03-23 DIAGNOSIS — E03.9 ACQUIRED HYPOTHYROIDISM: ICD-10-CM

## 2021-03-23 DIAGNOSIS — E11.9 TYPE 2 DIABETES MELLITUS WITHOUT COMPLICATION, WITHOUT LONG-TERM CURRENT USE OF INSULIN (HCC): Primary | ICD-10-CM

## 2021-03-23 RX ORDER — LEVOTHYROXINE SODIUM 0.1 MG/1
100 TABLET ORAL DAILY
Qty: 30 TABLET | Refills: 11 | Status: SHIPPED | OUTPATIENT
Start: 2021-03-23 | End: 2021-04-05 | Stop reason: SDUPTHER

## 2021-03-29 ENCOUNTER — OUTSIDE FACILITY SERVICE (OUTPATIENT)
Dept: FAMILY MEDICINE CLINIC | Facility: CLINIC | Age: 71
End: 2021-03-29

## 2021-03-29 LAB
BUN SERPL-MCNC: 22 MG/DL (ref 8–23)
BUN/CREAT SERPL: 18.8 (ref 7–25)
CALCIUM SERPL-MCNC: 9.9 MG/DL (ref 8.6–10.5)
CHLORIDE SERPL-SCNC: 102 MMOL/L (ref 98–107)
CHOLEST SERPL-MCNC: 180 MG/DL (ref 0–200)
CO2 SERPL-SCNC: 27.5 MMOL/L (ref 22–29)
CREAT SERPL-MCNC: 1.17 MG/DL (ref 0.57–1)
GLUCOSE SERPL-MCNC: 102 MG/DL (ref 65–99)
HBA1C MFR BLD: 6.3 % (ref 4.8–5.6)
HDLC SERPL-MCNC: 60 MG/DL (ref 40–60)
LDLC SERPL CALC-MCNC: 98 MG/DL (ref 0–100)
POTASSIUM SERPL-SCNC: 5 MMOL/L (ref 3.5–5.2)
SODIUM SERPL-SCNC: 139 MMOL/L (ref 136–145)
T4 FREE SERPL-MCNC: 1.47 NG/DL (ref 0.93–1.7)
TRIGL SERPL-MCNC: 126 MG/DL (ref 0–150)
TSH SERPL DL<=0.005 MIU/L-ACNC: 0.24 UIU/ML (ref 0.27–4.2)
VLDLC SERPL CALC-MCNC: 22 MG/DL (ref 5–40)

## 2021-03-29 PROCEDURE — OUTSIDEPOS PR OUTSIDE POS PLACEHOLDER: Performed by: FAMILY MEDICINE

## 2021-04-01 ENCOUNTER — TELEPHONE (OUTPATIENT)
Dept: GASTROENTEROLOGY | Facility: CLINIC | Age: 71
End: 2021-04-01

## 2021-04-01 NOTE — TELEPHONE ENCOUNTER
----- Message from Sushil Leiva MD sent at 3/29/2021  8:59 AM EDT -----  Polyp benign repeat: 5 years as discussed

## 2021-04-05 NOTE — PROGRESS NOTES
Subjective   Shell Sanford is a 70 y.o. female.     History of Present Illness     Chief Complaint:   Chief Complaint   Patient presents with   • Hypertension     MED REFILL - LAB RESULTS - MEDS REVIEWED WITH PT TODAY    • Hyperlipidemia     [facility] pt    • Hypothyroidism   • Diabetes       Shell Sanford 70 y.o. female who presents today for Medical Management of the below listed issues and medication refills.  she has a problem list of   Patient Active Problem List   Diagnosis   • Essential hypertension   • Hyperlipidemia   • Acquired hypothyroidism   • Bipolar disorder (CMS/HCC)   • Hypertensive chronic kidney disease with stage 1 through stage 4 chronic kidney disease, or unspecified chronic kidney disease   • Type 2 diabetes mellitus without complication, without long-term current use of insulin (CMS/[facility])   • Family history of colon cancer   .  Since the last visit, she has overall felt well.  she has been compliant with   Current Outpatient Medications:   •  ALPRAZolam (XANAX) 0.5 MG tablet, Take 0.5 mg by mouth As Needed., Disp: , Rfl:   •  ARIPiprazole (ABILIFY) 2 MG tablet, Take 2 mg by mouth Daily., Disp: , Rfl:   •  atorvastatin (LIPITOR) 40 MG tablet, Take 1 tablet by mouth Daily., Disp: 30 tablet, Rfl: 5  •  desonide (DesOwen) 0.05 % cream, Apply  topically to the appropriate area as directed 2 (Two) Times a Day., Disp: 15 g, Rfl: 0  •  fenofibrate micronized (ANTARA) 130 MG capsule, Take 1 capsule by mouth Every Morning Before Breakfast., Disp: 30 capsule, Rfl: 5  •  lamoTRIgine (LaMICtal) 200 MG tablet, Take 200 mg by mouth Daily., Disp: , Rfl:   •  levothyroxine (SYNTHROID, LEVOTHROID) 100 MCG tablet, Take 1 tablet by mouth Daily., Disp: 30 tablet, Rfl: 5  •  losartan (COZAAR) 25 MG tablet, Take 1 tablet by mouth Daily., Disp: 30 tablet, Rfl: 5  •  venlafaxine XR (EFFEXOR-XR) 75 MG 24 hr capsule, Take 225 mg by mouth Daily., Disp: , Rfl: .  she denies medication side effects.    All  "of the other chronic condition(s) listed above are stable w/o issues.    /79   Pulse 92   Temp 97.1 °F (36.2 °C) (Oral)   Resp 18   Ht 160 cm (63\")   Wt 72.1 kg (159 lb)   LMP  (LMP Unknown)   BMI 28.17 kg/m²     Results for orders placed or performed in visit on 03/23/21   Hemoglobin A1c    Specimen: Blood   Result Value Ref Range    Hemoglobin A1C 6.30 (H) 4.80 - 5.60 %   Basic Metabolic Panel    Specimen: Blood   Result Value Ref Range    Glucose 102 (H) 65 - 99 mg/dL    BUN 22 8 - 23 mg/dL    Creatinine 1.17 (H) 0.57 - 1.00 mg/dL    eGFR Non African Am 46 (L) >60 mL/min/1.73    eGFR African Am 55 (L) >60 mL/min/1.73    BUN/Creatinine Ratio 18.8 7.0 - 25.0    Sodium 139 136 - 145 mmol/L    Potassium 5.0 3.5 - 5.2 mmol/L    Chloride 102 98 - 107 mmol/L    Total CO2 27.5 22.0 - 29.0 mmol/L    Calcium 9.9 8.6 - 10.5 mg/dL   T4, Free    Specimen: Blood   Result Value Ref Range    Free T4 1.47 0.93 - 1.70 ng/dL   TSH    Specimen: Blood   Result Value Ref Range    TSH 0.243 (L) 0.270 - 4.200 uIU/mL   Lipid Panel    Specimen: Blood   Result Value Ref Range    Total Cholesterol 180 0 - 200 mg/dL    Triglycerides 126 0 - 150 mg/dL    HDL Cholesterol 60 40 - 60 mg/dL    VLDL Cholesterol Jonnathan 22 5 - 40 mg/dL    LDL Chol Calc (NIH) 98 0 - 100 mg/dL           The following portions of the patient's history were reviewed and updated as appropriate: allergies, current medications, past family history, past medical history, past social history, past surgical history and problem list.    Review of Systems   Constitutional: Negative for activity change, chills and fever.   Respiratory: Negative for cough.    Cardiovascular: Negative for chest pain.   Psychiatric/Behavioral: Negative for dysphoric mood.       Objective   Physical Exam  Constitutional:       General: She is not in acute distress.     Appearance: She is well-developed.   Cardiovascular:      Rate and Rhythm: Normal rate and regular rhythm.   Pulmonary:    "   Effort: Pulmonary effort is normal.      Breath sounds: Normal breath sounds.   Neurological:      Mental Status: She is alert and oriented to person, place, and time.   Psychiatric:         Behavior: Behavior normal.         Thought Content: Thought content normal.     Labs reviewed with pt today during visit. All questions answered.      Assessment/Plan   Diagnoses and all orders for this visit:    1. Type 2 diabetes mellitus without complication, without long-term current use of insulin (CMS/[facility]) (Primary)    2. Mixed hyperlipidemia  -     atorvastatin (LIPITOR) 40 MG tablet; Take 1 tablet by mouth Daily.  Dispense: 30 tablet; Refill: 5  -     fenofibrate micronized (ANTARA) 130 MG capsule; Take 1 capsule by mouth Every Morning Before Breakfast.  Dispense: 30 capsule; Refill: 5    3. Acquired hypothyroidism  -     levothyroxine (SYNTHROID, LEVOTHROID) 100 MCG tablet; Take 1 tablet by mouth Daily.  Dispense: 30 tablet; Refill: 5    4. Essential hypertension  -     losartan (COZAAR) 25 MG tablet; Take 1 tablet by mouth Daily.  Dispense: 30 tablet; Refill: 5

## 2021-04-06 ENCOUNTER — OFFICE VISIT (OUTPATIENT)
Dept: FAMILY MEDICINE CLINIC | Facility: CLINIC | Age: 71
End: 2021-04-06

## 2021-04-06 VITALS
TEMPERATURE: 97.1 F | DIASTOLIC BLOOD PRESSURE: 79 MMHG | HEIGHT: 63 IN | BODY MASS INDEX: 28.17 KG/M2 | WEIGHT: 159 LBS | HEART RATE: 92 BPM | RESPIRATION RATE: 18 BRPM | SYSTOLIC BLOOD PRESSURE: 121 MMHG

## 2021-04-06 DIAGNOSIS — E03.9 ACQUIRED HYPOTHYROIDISM: Chronic | ICD-10-CM

## 2021-04-06 DIAGNOSIS — E78.2 MIXED HYPERLIPIDEMIA: Chronic | ICD-10-CM

## 2021-04-06 DIAGNOSIS — I10 ESSENTIAL HYPERTENSION: Chronic | ICD-10-CM

## 2021-04-06 DIAGNOSIS — E11.9 TYPE 2 DIABETES MELLITUS WITHOUT COMPLICATION, WITHOUT LONG-TERM CURRENT USE OF INSULIN (HCC): Primary | Chronic | ICD-10-CM

## 2021-04-06 PROCEDURE — 99214 OFFICE O/P EST MOD 30 MIN: CPT | Performed by: FAMILY MEDICINE

## 2021-04-06 RX ORDER — LOSARTAN POTASSIUM 25 MG/1
25 TABLET ORAL DAILY
Qty: 30 TABLET | Refills: 5 | Status: SHIPPED | OUTPATIENT
Start: 2021-04-06 | End: 2021-06-30 | Stop reason: DRUGHIGH

## 2021-04-06 RX ORDER — LEVOTHYROXINE SODIUM 0.1 MG/1
100 TABLET ORAL DAILY
Qty: 30 TABLET | Refills: 5 | Status: SHIPPED | OUTPATIENT
Start: 2021-04-06 | End: 2021-11-01 | Stop reason: SDUPTHER

## 2021-04-06 RX ORDER — ATORVASTATIN CALCIUM 40 MG/1
40 TABLET, FILM COATED ORAL DAILY
Qty: 30 TABLET | Refills: 5 | Status: SHIPPED | OUTPATIENT
Start: 2021-04-06 | End: 2021-06-30 | Stop reason: DRUGHIGH

## 2021-04-06 RX ORDER — FENOFIBRATE 130 MG/1
130 CAPSULE ORAL
Qty: 30 CAPSULE | Refills: 5 | Status: SHIPPED | OUTPATIENT
Start: 2021-04-06 | End: 2021-10-18

## 2021-04-28 ENCOUNTER — OFFICE VISIT CONVERTED (OUTPATIENT)
Dept: PODIATRY | Facility: CLINIC | Age: 71
End: 2021-04-28
Attending: PODIATRIST

## 2021-05-11 NOTE — H&P
"   History and Physical      Patient Name: Shell Sanford   Patient ID: [id number]   Sex: Female   YOB: 1950    Primary Care Provider: Bryce Soto MD   Referring Provider: Lei Eldridge DPM    Visit Date: April 28, 2021    Provider: Lei Eldridge DPM   [place] [facility]   Location Address: [address] [place] [address]  [id number]   Location Phone: [phone]          Chief Complaint  · Right Foot Pain  · Left Foot Pain  · Callus   · Diabetic Foot Evaluation      History Of Present Illness  Shell Sanford presents to the office today for evaluation and treatment of painful calluses.   Shell Sanford presents to the office today as a FOLLOW UP/NEW PATIENT for a diabetic foot evaluation.   Patient reports that she is a diabetic currently controlling diabetes with diet controlled.      1 year  Onset:  gradual  Nature:  sore  Stable, worsening, improving:  worsening  Aggravating factors:  Patient reports lesion(s) is painful with shoegear and ambulation.    Previous Treatment:  self debridement    Patient denies any fevers, chills, nausea, vomiting, shortness of breathe, nor any other constitutional signs nor symptoms.    Patient states they are unsure of the most recent blood glucose reading.    Patient states that they retired from the [employer] in [place].\">New, Established, New Problem:  new  Location:  hyperkeratotic lesion(s) on heals and forefoot b/l.  Duration:  > 1 year  Onset:  gradual  Nature:  sore  Stable, worsening, improving:  worsening  Aggravating factors:  Patient reports lesion(s) is painful with shoegear and ambulation.    Previous Treatment:  self debridement    Patient denies any fevers, chills, nausea, vomiting, shortness of breathe, nor any other constitutional signs nor symptoms.    Patient states they are unsure of the most recent blood glucose reading.    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      New, Established, New Problem: 2nd problem  Location: bilateral feet  Duration:   Onset: gradual  Nature: NIDDM  Stable, worsening, improving: stable  Aggravating factors:  Previous Treatment: diet controlled.           Past Medical History  Breast cancer; Bunion; Corns and callus; Diabetes type 2, controlled; Hyperlipemia; Hypertension         Past Surgical History  Breast         Medication List  Abilify 2 mg oral tablet; Effexor XR 75 mg oral capsule,extended release 24hr; fenofibrate 120 mg oral tablet; hydrochlorothiazide 25 mg oral tablet; Lamictal 200 mg oral tablet; levothyroxine 100 mcg oral capsule; Lipitor 20 mg oral tablet; losartan 100 mg oral tablet; Prilosec OTC 20 mg oral tablet,delayed release (DR/EC); Xanax 0.5 mg oral tablet         Allergy List  NO KNOWN DRUG ALLERGIES       Allergies Reconciled  Family Medical History  FH: breast cancer; FH: colon cancer         Social History  Alcohol (Never); Tobacco (Current every day)         Review of Systems  · Constitutional  o Denies  o : fatigue, night sweats  · Eyes  o Denies  o : double vision, blurred vision  · HENT  o Denies  o : vertigo, recent head injury  · Cardiovascular  o Denies  o : chest pain, irregular heart beats  · Respiratory  o Denies  o : shortness of breath, productive cough  · Gastrointestinal  o Denies  o : nausea, vomiting  · Genitourinary  o Denies  o : dysuria, urinary retention  · Integument  o * See HPI  · Neurologic  o Denies  o : altered mental status, seizures  · Musculoskeletal  o Denies  o : joint swelling, limitation of motion  · Endocrine  o Denies  o : cold intolerance, heat intolerance  · Heme-Lymph  o Denies  o : petechiae, lymph node enlargement or tenderness  · Allergic-Immunologic  o Denies  o : frequent illnesses      Vitals  Date Time BP Position Site L\R Cuff Size HR RR TEMP (F) WT  HT  BMI kg/m2 BSA m2 O2 Sat FR L/min FiO2 HC       04/28/2021 10:10 /75 Sitting    91 - R  97.5 151lbs 0oz 5'  " 3\" 26.75 1.74 98 %            Physical Examination  · Constitutional  o Appearance  o : awake, alert, well-developed, and well nourished   · Cardiovascular  o Peripheral Vascular System  o :   § Extremities  § : no edema noted  · Musculoskeletal  o Extremeties/Joint  o : Lower extremity muscle strength and range of motion is equal and symmetrical bilaterally. The knees are noted to be in normal alignment. Ankle alignment and range of motion is normal and foot structure is normal. Subtalar, metatarsal and metatarsal-phalangeal joint range of motion is noted to be within normal limits. The digits of both feet are in normal alignment. The gait is normal.  · Left DM Foot Exam  o Sensation  o : Sharp/dull sensation is within normal limits. Davison-Haja 5.07 monofilament intact to all assessed areas.   o Visual Inspection  o : Skin is noted to have normal texture and turgor, with no excrescences noted. The toenails are noted to be without disease  o Vascular  o : palpable dorsalis pedis and posterir tibialis pulses present, normal capillary refill  · Right DM Foot Exam  o Sensation  o : Sharp/dull sensation is within normal limits. Davison-Haja 5.07 monofilament intact to all assessed areas.   o Visual Inspection  o : Skin is noted to have normal texture and turgor, with no excrescences noted. The toenails are noted to be without disease  o Vascular  o : palpable dorsalis pedis and posterir tibialis pulses present, normal capillary refill     DERM:  focused hyperkeratotic lesion on plantar heels and forefoot b/l.  Partial thickness debridement with #15 scalpel blade down to health tissue.  No signs of edema, erythema, lymphangitis, nor signs of infection.           Assessment  · Callus     700/L84  · Diabetes     250.00/E11.9  · Foot pain, left     729.5/M79.672  · Foot pain, right     729.5/M79.671      Plan  · Orders  o Diabetic Foot (Motor and Sensory) Exam Completed [facility] (, , 2028F) - - " 04/28/2021  o Paring of 2 to 4 calluses (94201) - - 04/28/2021  · Medications  o Medications have been Reconciled  o Transition of Care or Provider Policy  · Instructions  o Follow-up in 9 weeks  o I have discussed the findings of this evaluation with the patient. The discussion included a complete verbal explanation of any changes in the examination results, diagnosis, and the current treatment plan. A schedule for future care needs was explained. If any questions should arise after returning home, I have encouraged the patient to feel free to contact Dr. Eldridge. The patient states understanding and agreement with this plan.   o Pt to monitor for problems and to contact Dr. Eldridge for follow-up should such signs occur. Patient states understanding and agreement with this plan.   o Diabetic foot exam performed and documented this date, compliant with CQM required standards. Detail of findings as noted in physical exam.Lower extremity Neuro exam for diabetic patient performed and documented this date, compliant with PQRS required standards. Detail of findings as noted in physical exam.Advised patient importance of good routine lower extremity hygiene. Advised patient importance of evaluating for intact skin and pain free nail borders. Advised patient to use mirror to evaluate plantar/ soles of feet for better visualization. Advised patient monitor and phone office to be seen if any cracking to skin, open lesions, painful nail borders or if nails become elongated prior to next visit. Advised patient importance of daily cleansing of lower extremities, followed by good skin cream to maintain normal hydration of skin. Also advised patient importance of close daily monitoring of blood sugar. Advised to regulate diet and medications to maintain control of blood sugar in optimal range. Contact primary care provider if difficulties maintaining blood sugar levels.Advised Patient of presence of Diabetes Mellitus condition.  Advised Patient risk of progression and worsening or improvement, then return of condition. Will monitor condition for any change in future. Treat with most appropriate treatment pending status of condition.Counseled and advised patient extensively on nature and ramifications of diabetes. Standard instructions given to patient for good diabetic foot care and maintenance. Advised importance of careful monitoring to avoid break down and complications secondary to diabetes. Advised patient importance of strict maintenance of blood sugar control. Advised patient of possible ominous results from neglect of condition,i.e.: amputation/ loss of digits, feet and legs, or even death.Patient states understands counseling, will monitor closely, continue good hygiene and routine diabetic foot care. Patient will contact office is questions or problems.   o Patient is to return in one year for their Podiatric Diabetic Evaluation.   o Electronically Identified Patient Education Materials Provided Electronically  · Disposition  o Call or Return if symptoms worsen or persist.            Electronically Signed by: Lei Eldridge DPM -Author on April 28, 2021 10:51:23 AM

## 2021-05-14 VITALS
HEIGHT: 63 IN | BODY MASS INDEX: 26.75 KG/M2 | OXYGEN SATURATION: 98 % | DIASTOLIC BLOOD PRESSURE: 75 MMHG | SYSTOLIC BLOOD PRESSURE: 125 MMHG | TEMPERATURE: 97.5 F | WEIGHT: 151 LBS | HEART RATE: 91 BPM

## 2021-05-18 DIAGNOSIS — I10 ESSENTIAL HYPERTENSION: Chronic | ICD-10-CM

## 2021-05-18 DIAGNOSIS — E78.2 MIXED HYPERLIPIDEMIA: Chronic | ICD-10-CM

## 2021-05-18 RX ORDER — ATORVASTATIN CALCIUM 40 MG/1
40 TABLET, FILM COATED ORAL DAILY
Qty: 30 TABLET | Refills: 5 | OUTPATIENT
Start: 2021-05-18

## 2021-05-18 RX ORDER — LOSARTAN POTASSIUM 25 MG/1
25 TABLET ORAL DAILY
Qty: 30 TABLET | Refills: 5 | OUTPATIENT
Start: 2021-05-18 | End: 2022-05-18

## 2021-06-30 ENCOUNTER — OFFICE VISIT (OUTPATIENT)
Dept: PODIATRY | Facility: CLINIC | Age: 71
End: 2021-06-30

## 2021-06-30 VITALS
HEART RATE: 93 BPM | OXYGEN SATURATION: 99 % | HEIGHT: 63 IN | TEMPERATURE: 97.1 F | SYSTOLIC BLOOD PRESSURE: 133 MMHG | WEIGHT: 158 LBS | BODY MASS INDEX: 28 KG/M2 | DIASTOLIC BLOOD PRESSURE: 93 MMHG

## 2021-06-30 DIAGNOSIS — L84 CALLUS OF FOOT: ICD-10-CM

## 2021-06-30 DIAGNOSIS — M79.672 FOOT PAIN, BILATERAL: Primary | ICD-10-CM

## 2021-06-30 DIAGNOSIS — M79.671 FOOT PAIN, BILATERAL: Primary | ICD-10-CM

## 2021-06-30 PROCEDURE — 99213 OFFICE O/P EST LOW 20 MIN: CPT | Performed by: PODIATRIST

## 2021-06-30 PROCEDURE — 11056 PARNG/CUTG B9 HYPRKR LES 2-4: CPT | Performed by: PODIATRIST

## 2021-06-30 RX ORDER — ATORVASTATIN CALCIUM 20 MG/1
TABLET, FILM COATED ORAL
COMMUNITY
End: 2021-11-02 | Stop reason: SDUPTHER

## 2021-06-30 RX ORDER — LOSARTAN POTASSIUM 100 MG/1
TABLET ORAL
COMMUNITY
End: 2021-11-02 | Stop reason: SDUPTHER

## 2021-06-30 RX ORDER — OMEPRAZOLE 20 MG/1
TABLET, DELAYED RELEASE ORAL
COMMUNITY

## 2021-06-30 RX ORDER — HYDROCHLOROTHIAZIDE 25 MG/1
TABLET ORAL
COMMUNITY
End: 2021-11-02 | Stop reason: SDUPTHER

## 2021-06-30 NOTE — PATIENT INSTRUCTIONS
Patient is to monitor for recurrence and any new symptoms and to contact Dr. Eldridge's office for a follow-up appointment.      The patient states understanding and agreement with this plan.

## 2021-06-30 NOTE — PROGRESS NOTES
[facility]    Today's Date: 06/30/21    Patient Name: Shell Sanford  MRN: [id number]  CSN: [id number]  PCP: Bryce Soto MD  Referring Provider: No ref. provider found    SUBJECTIVE     Chief Complaint   Patient presents with   • Left Foot - Callouses   • Right Foot - Callouses     HPI: Shell Sanford, a 70 y.o.female, comes presents to clinic with chief complaint of:    New, Established, New Problem: New    Location:  hyperkeratotic lesion(s) on bilateral forefoot    Duration: Greater than 1 year    Onset:  gradual    Nature:  sore    Stable, worsening, improving: Stable, recurring    Aggravating factors:  Patient reports lesion(s) is painful with shoegear and ambulation.      Previous Treatment:   Debridement    Past Medical History:   Diagnosis Date   • Anxiety    • Bipolar affect, depressed (CMS/HCC)    • Chronic constipation    • GERD (gastroesophageal reflux disease)    • H/O complete eye exam 01/01/2017   • History of breast cancer 2011   • History of gastric ulcer    • Hyperlipidemia    • Hypertension    • Hypothyroidism     HYPOTHYROIDISM   • Prediabetes     NO MEDS   • Sleep apnea     NO MACHINE, WEARS MOUTH NIGHTGUARD   • Upper abdominal pain      Past Surgical History:   Procedure Laterality Date   • BREAST SURGERY     • COLONOSCOPY  11/2015   • COLONOSCOPY N/A 3/19/2021    Procedure: COLONOSCOPY TO CECUM AND TI WITH COLD SNARE POLYPECTOMY;  Surgeon: Sushil Leiva MD;  Location: [facility] ENDOSCOPY;  Service: Gastroenterology;  Laterality: N/A;  PRE- FAMILY HX COLON CANCER, PERSONAL HX COLON POLYPS  POST- POLYP, DIVERTICULOSIS   • HYSTERECTOMY     • MASTECTOMY Bilateral 2012    double     Family History   Problem Relation Age of Onset   • Colon cancer Father 60   • Breast cancer Sister 39   • Malig Hyperthermia Neg Hx      Social History     Socioeconomic History   • Marital status:      Spouse name: Not on file   • Number of children: Not on file   • Years of  education: Not on file   • Highest education level: Not on file   Tobacco Use   • Smoking status: Current Every Day Smoker     Packs/day: 0.50     Types: Cigarettes     Start date: 1980   • Smokeless tobacco: Never Used   Vaping Use   • Vaping Use: Never used   Substance and Sexual Activity   • Alcohol use: No   • Drug use: Never   • Sexual activity: Defer     Allergies   Allergen Reactions   • Keflet [Cephalexin] Nausea And Vomiting and GI Intolerance   • Penicillins Other (See Comments)     MOTHER & DAUGHTER HAD SEVERE REACTION.  DAUGHTER HAD EXTREME SWELLING AND TROUBLE BREATHING   • Pramoxine-Benzalkonium Cl Other (See Comments)   • Sulfamethoxazole-Trimethoprim Other (See Comments)   • Neosporin [Neomycin-Polymyxin-Gramicidin] Rash     Current Outpatient Medications   Medication Sig Dispense Refill   • ALPRAZolam (XANAX) 0.5 MG tablet Take 0.5 mg by mouth As Needed.     • ARIPiprazole (ABILIFY) 2 MG tablet Take 2 mg by mouth Daily.     • atorvastatin (Lipitor) 20 MG tablet Lipitor 20 mg oral tablet take 1 tablet (20 mg) by oral route once daily   Active     • fenofibrate micronized (ANTARA) 130 MG capsule Take 1 capsule by mouth Every Morning Before Breakfast. 30 capsule 5   • hydroCHLOROthiazide (HYDRODIURIL) 25 MG tablet hydrochlorothiazide 25 mg oral tablet take 1 tablet (25 mg) by oral route once daily   Active     • lamoTRIgine (LaMICtal) 200 MG tablet Take 200 mg by mouth Daily.     • levothyroxine (SYNTHROID, LEVOTHROID) 100 MCG tablet Take 1 tablet by mouth Daily. 30 tablet 5   • losartan (COZAAR) 100 MG tablet losartan 100 mg oral tablet take 1 tablet (100 mg) by oral route once daily   Active     • omeprazole OTC (PrilOSEC OTC) 20 MG EC tablet Prilosec OTC 20 mg oral tablet,delayed release (DR/EC) take 1 tablet by oral route daily   Active     • venlafaxine XR (EFFEXOR-XR) 75 MG 24 hr capsule Take 75 mg by mouth 3 (Three) Times a Day.     • atorvastatin (LIPITOR) 40 MG tablet Take 1 tablet by mouth  Daily. 30 tablet 5   • desonide (DesOwen) 0.05 % cream Apply  topically to the appropriate area as directed 2 (Two) Times a Day. 15 g 0   • losartan (COZAAR) 25 MG tablet Take 1 tablet by mouth Daily. 30 tablet 5     No current facility-administered medications for this visit.     Review of Systems   Constitutional: Negative.    Skin:        Painful calluses on forefoot bilaterally   All other systems reviewed and are negative.      OBJECTIVE     Vitals:    06/30/21 1021   BP: 133/93   Pulse: 93   Temp: 97.1 °F (36.2 °C)   SpO2: 99%       Body mass index is 27.99 kg/m².    Lab Results   Component Value Date    GLUCOSE 117 (H) 02/05/2015    CALCIUM 9.9 03/29/2021     03/29/2021    K 5.0 03/29/2021    CO2 27.5 03/29/2021     03/29/2021    BUN 22 03/29/2021    CREATININE 1.17 (H) 03/29/2021    EGFRIFAFRI 55 (L) 03/29/2021    EGFRIFNONA 46 (L) 03/29/2021    BCR 18.8 03/29/2021       Patient seen in no apparent distress.      PHYSICAL EXAM:     Foot/Ankle Exam:       General:   Appearance: appears stated age and healthy    Orientation: AAOx3    Affect: appropriate    Gait: unimpaired    Shoe Gear:  Sandals    VASCULAR      Right Foot Vascularity   Normal vascular exam    Dorsalis pedis:  2+  Posterior tibial:  2+  Skin Temperature: warm    Edema Grading:  None  CFT:  < 3 seconds  Pedal Hair Growth:  Present  Varicosities: none       Left Foot Vascularity   Normal vascular exam    Dorsalis pedis:  2+  Posterior tibial:  2+  Skin Temperature: warm    Edema Grading:  None  CFT:  < 3 seconds  Pedal Hair Growth:  Present  Varicosities: none        NEUROLOGIC     Right Foot Neurologic   Normal sensation    Light touch sensation:  Normal  Vibratory sensation:  Normal  Hot/Cold sensation: normal    Protective Sensation using [place]aja Monofilament:  10     Left Foot Neurologic   Normal sensation    Light touch sensation:  Normal  Vibratory sensation:  Normal  Hot/cold sensation: normal    Protective Sensation  using [place]aja Monofilament:  10     MUSCLE STRENGTH     Right Foot Muscle Strength   Normal strength    Foot dorsiflexion:  5  Foot plantar flexion:  5  Foot inversion:  5  Foot eversion:  5     Left Foot Muscle Strength   Foot dorsiflexion:  5  Foot plantar flexion:  5  Foot inversion:  5  Foot eversion:  5     DERMATOLOGIC     Right Foot Dermatologic   Skin: corn       Left Foot Dermatologic   Skin: corn        Right Foot Additional Comments Hyperkeratotic lesions x2 in plantar forefoot.  These were debrided down to healthy tissue with a #15 scalpel blade.      Left Foot Additional Comments: Hyperkeratotic lesions x2 in plantar forefoot.  These were debrided down to healthy tissue with a #15 scalpel blade.        ASSESSMENT/PLAN     Diagnoses and all orders for this visit:    1. Foot pain, bilateral (Primary)    2. Callus of foot        Comprehensive lower extremity examination and evaluation was performed.    Discussed findings and treatment plan including risks, benefits, and treatment options with patient in detail. Patient agreed with treatment plan.    Diabetic foot exam performed and documented this date, compliant with CQM required standards. Detail of findings as noted in physical exam.  Lower extremity Neurologic exam for diabetic patient performed and documented this date, compliant with PQRS required standards. Detail of findings as noted in physical exam.  Advised patient importance of good routine lower extremity hygiene. Advised patient importance of evaluating for intact skin and pain free nail borders.  Advised patient to use mirror to evaluate plantar/ soles of feet for better visualization. Advised patient monitor and phone office to be seen if any cracking to skin, open lesions, painful nail borders or if nails become elongated prior to next visit. Advised patient importance of daily cleansing of lower extremities, followed by good skin cream to maintain normal hydration of skin. Also  advised patient importance of close daily monitoring of blood sugar. Advised to regulate diet and medications to maintain control of blood sugar in optimal range. Contact primary care provider if difficulties maintaining blood sugar levels.  Advised Patient of presence of Diabetes Mellitus condition.  Advised Patient risk of progression and worsening or improvement, then return of condition.  Will monitor condition for any change in future. Treat with most appropriate treatment pending status of condition.  Counseled and advised patient extensively on nature and ramifications of diabetes. Standard instructions given to patient for good diabetic foot care and maintenance. Advised importance of careful monitoring to avoid break down and complications secondary to diabetes. Advised patient importance of strict maintenance of blood sugar control. Advised patient of possible ominous results from neglect of condition, i.e.: amputation/ loss of digits, feet and legs, or even death.  Patient states understands counseling, will monitor closely, continue good hygiene and routine diabetic foot care. Patient will contact office is questions or problems.      An After Visit Summary was printed and given to the patient at discharge, including (if requested) any available informative/educational handouts regarding diagnosis, treatment, or medications. All questions were answered to patient/family satisfaction. Should symptoms fail to improve or worsen they agree to call or return to clinic or to go to the Emergency Department. Discussed the importance of following up with any needed screening tests/labs/specialist appointments and any requested follow-up recommended by me today. Importance of maintaining follow-up discussed and patient accepts that missed appointments can delay diagnosis and potentially lead to worsening of conditions.    Return in about 5 weeks (around 8/4/2021), or Callus follow-up., or sooner if acute issues  arise.    This document has been electronically signed by Lei Eldridge DPM on June 30, 2021 11:35 EDT

## 2021-09-22 ENCOUNTER — OFFICE VISIT (OUTPATIENT)
Dept: PODIATRY | Facility: CLINIC | Age: 71
End: 2021-09-22

## 2021-09-22 VITALS
TEMPERATURE: 96.9 F | BODY MASS INDEX: 28.17 KG/M2 | DIASTOLIC BLOOD PRESSURE: 79 MMHG | WEIGHT: 159 LBS | HEIGHT: 63 IN | OXYGEN SATURATION: 99 % | SYSTOLIC BLOOD PRESSURE: 106 MMHG | HEART RATE: 92 BPM

## 2021-09-22 DIAGNOSIS — M79.671 FOOT PAIN, BILATERAL: Primary | ICD-10-CM

## 2021-09-22 DIAGNOSIS — L84 CALLUS OF FOOT: ICD-10-CM

## 2021-09-22 DIAGNOSIS — M79.672 FOOT PAIN, BILATERAL: Primary | ICD-10-CM

## 2021-09-22 PROCEDURE — 11056 PARNG/CUTG B9 HYPRKR LES 2-4: CPT | Performed by: PODIATRIST

## 2021-09-22 NOTE — PROGRESS NOTES
[facility]    Today's Date: 09/22/21    Patient Name: Shell Sanford  MRN: [id number]  CSN: [id number]  PCP: Bryce Soto MD, last visit, 5/18/2021  Referring Provider: No ref. provider found    SUBJECTIVE     Chief Complaint   Patient presents with   • Left Foot - Callouses, Follow-up   • Right Foot - Callouses, Follow-up     HPI: Shell Sanford, a 70 y.o.female, comes presents to clinic with chief complaint of:    New, Established, New Problem: Established    Location:  hyperkeratotic lesion(s) on bilateral forefoot    Duration: Greater than 1 year    Onset:  gradual    Nature:  sore    Stable, worsening, improving: Stable, recurring    Aggravating factors:  Patient reports lesion(s) is painful with shoegear and ambulation.      Previous Treatment:   Debridement    Patient relates no medical changes since their last visit.    Past Medical History:   Diagnosis Date   • Anxiety    • Bipolar affect, depressed (CMS/HCC)    • Chronic constipation    • GERD (gastroesophageal reflux disease)    • H/O complete eye exam 01/01/2017   • History of breast cancer 2011   • History of gastric ulcer    • Hyperlipidemia    • Hypertension    • Hypothyroidism     HYPOTHYROIDISM   • Prediabetes     NO MEDS   • Sleep apnea     NO MACHINE, WEARS MOUTH NIGHTGUARD   • Upper abdominal pain      Past Surgical History:   Procedure Laterality Date   • BREAST SURGERY     • COLONOSCOPY  11/2015   • COLONOSCOPY N/A 3/19/2021    Procedure: COLONOSCOPY TO CECUM AND TI WITH COLD SNARE POLYPECTOMY;  Surgeon: Sushil Leiva MD;  Location: [facility] ENDOSCOPY;  Service: Gastroenterology;  Laterality: N/A;  PRE- FAMILY HX COLON CANCER, PERSONAL HX COLON POLYPS  POST- POLYP, DIVERTICULOSIS   • EYE SURGERY     • HYSTERECTOMY     • MASTECTOMY Bilateral 2012    double     Family History   Problem Relation Age of Onset   • Colon cancer Father 60   • Breast cancer Sister 39   • Malig Hyperthermia Neg Hx      Social History      Socioeconomic History   • Marital status:      Spouse name: Not on file   • Number of children: Not on file   • Years of education: Not on file   • Highest education level: Not on file   Tobacco Use   • Smoking status: Current Every Day Smoker     Packs/day: 0.50     Types: Cigarettes     Start date: 1980   • Smokeless tobacco: Never Used   Vaping Use   • Vaping Use: Never used   Substance and Sexual Activity   • Alcohol use: No   • Drug use: Never   • Sexual activity: Defer     Allergies   Allergen Reactions   • Keflet [Cephalexin] Nausea And Vomiting and GI Intolerance   • Penicillins Other (See Comments)     MOTHER & DAUGHTER HAD SEVERE REACTION.  DAUGHTER HAD EXTREME SWELLING AND TROUBLE BREATHING   • Pramoxine-Benzalkonium Cl Other (See Comments)   • Sulfamethoxazole-Trimethoprim Other (See Comments)   • Neosporin [Neomycin-Polymyxin-Gramicidin] Rash     Current Outpatient Medications   Medication Sig Dispense Refill   • ALPRAZolam (XANAX) 0.5 MG tablet Take 0.5 mg by mouth As Needed.     • ARIPiprazole (ABILIFY) 2 MG tablet Take 2 mg by mouth Daily.     • atorvastatin (Lipitor) 20 MG tablet Lipitor 20 mg oral tablet take 1 tablet (20 mg) by oral route once daily   Active     • fenofibrate micronized (ANTARA) 130 MG capsule Take 1 capsule by mouth Every Morning Before Breakfast. 30 capsule 5   • lamoTRIgine (LaMICtal) 200 MG tablet Take 200 mg by mouth Daily.     • levothyroxine (SYNTHROID, LEVOTHROID) 100 MCG tablet Take 1 tablet by mouth Daily. 30 tablet 5   • losartan (COZAAR) 100 MG tablet losartan 100 mg oral tablet take 1 tablet (100 mg) by oral route once daily   Active     • omeprazole OTC (PrilOSEC OTC) 20 MG EC tablet Prilosec OTC 20 mg oral tablet,delayed release (DR/EC) take 1 tablet by oral route daily   Active     • venlafaxine XR (EFFEXOR-XR) 75 MG 24 hr capsule Take 75 mg by mouth 3 (Three) Times a Day.     • hydroCHLOROthiazide (HYDRODIURIL) 25 MG tablet hydrochlorothiazide 25 mg  oral tablet take 1 tablet (25 mg) by oral route once daily   Active       No current facility-administered medications for this visit.     Review of Systems   Constitutional: Negative.    Skin:        Painful calluses on forefoot bilaterally   All other systems reviewed and are negative.      OBJECTIVE     Vitals:    09/22/21 0840   BP: 106/79   Pulse: 92   Temp: 96.9 °F (36.1 °C)   SpO2: 99%       Body mass index is 28.17 kg/m².    Lab Results   Component Value Date    GLUCOSE 117 (H) 02/05/2015    CALCIUM 9.9 03/29/2021     03/29/2021    K 5.0 03/29/2021    CO2 27.5 03/29/2021     03/29/2021    BUN 22 03/29/2021    CREATININE 1.17 (H) 03/29/2021    EGFRIFAFRI 55 (L) 03/29/2021    EGFRIFNONA 46 (L) 03/29/2021    BCR 18.8 03/29/2021       Patient seen in no apparent distress.      PHYSICAL EXAM:     Foot/Ankle Exam:       General:   Appearance: appears stated age and healthy    Orientation: AAOx3    Affect: appropriate    Gait: unimpaired    Shoe Gear:  Sandals    VASCULAR      Right Foot Vascularity   Normal vascular exam    Dorsalis pedis:  2+  Posterior tibial:  2+  Skin Temperature: warm    Edema Grading:  None  CFT:  < 3 seconds  Pedal Hair Growth:  Present  Varicosities: none       Left Foot Vascularity   Normal vascular exam    Dorsalis pedis:  2+  Posterior tibial:  2+  Skin Temperature: warm    Edema Grading:  None  CFT:  < 3 seconds  Pedal Hair Growth:  Present  Varicosities: none        NEUROLOGIC     Right Foot Neurologic   Normal sensation    Light touch sensation:  Normal  Vibratory sensation:  Normal  Hot/Cold sensation: normal    Protective Sensation using [place]-Haja Monofilament:  10     Left Foot Neurologic   Normal sensation    Light touch sensation:  Normal  Vibratory sensation:  Normal  Hot/cold sensation: normal    Protective Sensation using [place]aja Monofilament:  10     MUSCLE STRENGTH     Right Foot Muscle Strength   Normal strength    Foot dorsiflexion:  5  Foot  plantar flexion:  5  Foot inversion:  5  Foot eversion:  5     Left Foot Muscle Strength   Foot dorsiflexion:  5  Foot plantar flexion:  5  Foot inversion:  5  Foot eversion:  5     DERMATOLOGIC     Right Foot Dermatologic   Skin: corn       Left Foot Dermatologic   Skin: corn        Right Foot Additional Comments Hyperkeratotic lesions x 2 in plantar forefoot.  These were debrided down to healthy tissue with a #10 scalpel blade.      Left Foot Additional Comments: Hyperkeratotic lesions x 2 in plantar forefoot.  These were debrided down to healthy tissue with a #10 scalpel blade.        ASSESSMENT/PLAN     Diagnoses and all orders for this visit:    1. Foot pain, bilateral (Primary)    2. Callus of foot        Comprehensive lower extremity examination and evaluation was performed.    Discussed findings and treatment plan including risks, benefits, and treatment options with patient in detail. Patient agreed with treatment plan.    An After Visit Summary was printed and given to the patient at discharge, including (if requested) any available informative/educational handouts regarding diagnosis, treatment, or medications. All questions were answered to patient/family satisfaction. Should symptoms fail to improve or worsen they agree to call or return to clinic or to go to the Emergency Department. Discussed the importance of following up with any needed screening tests/labs/specialist appointments and any requested follow-up recommended by me today. Importance of maintaining follow-up discussed and patient accepts that missed appointments can delay diagnosis and potentially lead to worsening of conditions.    Return in about 5 weeks (around 10/27/2021) for Callus follow-up., or sooner if acute issues arise.    This document has been electronically signed by Lei Eldridge DPM on September 22, 2021 08:51 EDT

## 2021-10-15 RX ORDER — ALPRAZOLAM 0.5 MG/1
0.5 TABLET ORAL AS NEEDED
Qty: 10 TABLET | Refills: 0 | Status: SHIPPED | OUTPATIENT
Start: 2021-10-15

## 2021-10-18 DIAGNOSIS — E78.2 MIXED HYPERLIPIDEMIA: Chronic | ICD-10-CM

## 2021-10-18 RX ORDER — FENOFIBRATE 130 MG/1
130 CAPSULE ORAL
Qty: 30 CAPSULE | Refills: 0 | Status: SHIPPED | OUTPATIENT
Start: 2021-10-18 | End: 2021-11-01 | Stop reason: SDUPTHER

## 2021-10-26 ENCOUNTER — TELEPHONE (OUTPATIENT)
Dept: FAMILY MEDICINE CLINIC | Facility: CLINIC | Age: 71
End: 2021-10-26

## 2021-10-26 DIAGNOSIS — E11.9 TYPE 2 DIABETES MELLITUS WITHOUT COMPLICATION, WITHOUT LONG-TERM CURRENT USE OF INSULIN (HCC): Primary | ICD-10-CM

## 2021-10-26 DIAGNOSIS — I10 ESSENTIAL HYPERTENSION: ICD-10-CM

## 2021-10-26 DIAGNOSIS — E03.9 ACQUIRED HYPOTHYROIDISM: ICD-10-CM

## 2021-10-30 LAB
ALBUMIN SERPL-MCNC: 4.7 G/DL (ref 3.7–4.7)
ALBUMIN/GLOB SERPL: 1.7 {RATIO} (ref 1.2–2.2)
ALP SERPL-CCNC: 98 IU/L (ref 44–121)
ALT SERPL-CCNC: 22 IU/L (ref 0–32)
AST SERPL-CCNC: 26 IU/L (ref 0–40)
BASOPHILS # BLD AUTO: 0.1 X10E3/UL (ref 0–0.2)
BASOPHILS NFR BLD AUTO: 1 %
BILIRUB SERPL-MCNC: 0.4 MG/DL (ref 0–1.2)
BUN SERPL-MCNC: 23 MG/DL (ref 8–27)
BUN/CREAT SERPL: 19 (ref 12–28)
CALCIUM SERPL-MCNC: 9.6 MG/DL (ref 8.7–10.3)
CHLORIDE SERPL-SCNC: 103 MMOL/L (ref 96–106)
CHOLEST SERPL-MCNC: 182 MG/DL (ref 100–199)
CO2 SERPL-SCNC: 21 MMOL/L (ref 20–29)
CREAT SERPL-MCNC: 1.2 MG/DL (ref 0.57–1)
EOSINOPHIL # BLD AUTO: 0.2 X10E3/UL (ref 0–0.4)
EOSINOPHIL NFR BLD AUTO: 3 %
ERYTHROCYTE [DISTWIDTH] IN BLOOD BY AUTOMATED COUNT: 13 % (ref 11.7–15.4)
GLOBULIN SER CALC-MCNC: 2.8 G/DL (ref 1.5–4.5)
GLUCOSE SERPL-MCNC: 111 MG/DL (ref 65–99)
HBA1C MFR BLD: 6.7 % (ref 4.8–5.6)
HCT VFR BLD AUTO: 41.2 % (ref 34–46.6)
HDLC SERPL-MCNC: 58 MG/DL
HGB BLD-MCNC: 13.8 G/DL (ref 11.1–15.9)
IMM GRANULOCYTES # BLD AUTO: 0 X10E3/UL (ref 0–0.1)
IMM GRANULOCYTES NFR BLD AUTO: 0 %
LDLC SERPL CALC-MCNC: 104 MG/DL (ref 0–99)
LYMPHOCYTES # BLD AUTO: 2.5 X10E3/UL (ref 0.7–3.1)
LYMPHOCYTES NFR BLD AUTO: 27 %
MCH RBC QN AUTO: 28.8 PG (ref 26.6–33)
MCHC RBC AUTO-ENTMCNC: 33.5 G/DL (ref 31.5–35.7)
MCV RBC AUTO: 86 FL (ref 79–97)
MICROALBUMIN UR-MCNC: 71.1 UG/ML
MONOCYTES # BLD AUTO: 0.6 X10E3/UL (ref 0.1–0.9)
MONOCYTES NFR BLD AUTO: 6 %
NEUTROPHILS # BLD AUTO: 6 X10E3/UL (ref 1.4–7)
NEUTROPHILS NFR BLD AUTO: 63 %
PLATELET # BLD AUTO: 391 X10E3/UL (ref 150–450)
POTASSIUM SERPL-SCNC: 4.9 MMOL/L (ref 3.5–5.2)
PROT SERPL-MCNC: 7.5 G/DL (ref 6–8.5)
RBC # BLD AUTO: 4.79 X10E6/UL (ref 3.77–5.28)
SODIUM SERPL-SCNC: 142 MMOL/L (ref 134–144)
T4 FREE SERPL-MCNC: 1.63 NG/DL (ref 0.82–1.77)
TRIGL SERPL-MCNC: 113 MG/DL (ref 0–149)
TSH SERPL DL<=0.005 MIU/L-ACNC: 0.31 UIU/ML (ref 0.45–4.5)
VLDLC SERPL CALC-MCNC: 20 MG/DL (ref 5–40)
WBC # BLD AUTO: 9.5 X10E3/UL (ref 3.4–10.8)

## 2021-11-02 ENCOUNTER — OFFICE VISIT (OUTPATIENT)
Dept: FAMILY MEDICINE CLINIC | Facility: CLINIC | Age: 71
End: 2021-11-02

## 2021-11-02 VITALS
WEIGHT: 159 LBS | HEIGHT: 63 IN | BODY MASS INDEX: 28.17 KG/M2 | TEMPERATURE: 96.8 F | HEART RATE: 90 BPM | DIASTOLIC BLOOD PRESSURE: 82 MMHG | SYSTOLIC BLOOD PRESSURE: 108 MMHG | RESPIRATION RATE: 18 BRPM

## 2021-11-02 DIAGNOSIS — G47.8 SLEEP AROUSAL DISORDER: ICD-10-CM

## 2021-11-02 DIAGNOSIS — I10 ESSENTIAL HYPERTENSION: Chronic | ICD-10-CM

## 2021-11-02 DIAGNOSIS — Z00.00 MEDICARE ANNUAL WELLNESS VISIT, SUBSEQUENT: Primary | ICD-10-CM

## 2021-11-02 DIAGNOSIS — E03.9 ACQUIRED HYPOTHYROIDISM: Chronic | ICD-10-CM

## 2021-11-02 DIAGNOSIS — E11.9 TYPE 2 DIABETES MELLITUS WITHOUT COMPLICATION, WITHOUT LONG-TERM CURRENT USE OF INSULIN (HCC): Chronic | ICD-10-CM

## 2021-11-02 DIAGNOSIS — E78.2 MIXED HYPERLIPIDEMIA: Chronic | ICD-10-CM

## 2021-11-02 PROCEDURE — 99214 OFFICE O/P EST MOD 30 MIN: CPT | Performed by: FAMILY MEDICINE

## 2021-11-02 PROCEDURE — 1170F FXNL STATUS ASSESSED: CPT | Performed by: FAMILY MEDICINE

## 2021-11-02 PROCEDURE — 1160F RVW MEDS BY RX/DR IN RCRD: CPT | Performed by: FAMILY MEDICINE

## 2021-11-02 PROCEDURE — 1126F AMNT PAIN NOTED NONE PRSNT: CPT | Performed by: FAMILY MEDICINE

## 2021-11-02 PROCEDURE — G0439 PPPS, SUBSEQ VISIT: HCPCS | Performed by: FAMILY MEDICINE

## 2021-11-02 RX ORDER — ATORVASTATIN CALCIUM 20 MG/1
20 TABLET, FILM COATED ORAL DAILY
Qty: 90 TABLET | Refills: 1 | Status: SHIPPED | OUTPATIENT
Start: 2021-11-02 | End: 2021-11-08 | Stop reason: SDUPTHER

## 2021-11-02 RX ORDER — LOSARTAN POTASSIUM 100 MG/1
100 TABLET ORAL DAILY
Qty: 90 TABLET | Refills: 1 | Status: SHIPPED | OUTPATIENT
Start: 2021-11-02 | End: 2021-11-08 | Stop reason: SDUPTHER

## 2021-11-02 RX ORDER — FENOFIBRATE 130 MG/1
130 CAPSULE ORAL
Qty: 90 CAPSULE | Refills: 1 | Status: SHIPPED | OUTPATIENT
Start: 2021-11-02 | End: 2022-05-04 | Stop reason: SDUPTHER

## 2021-11-02 RX ORDER — LEVOTHYROXINE SODIUM 0.1 MG/1
100 TABLET ORAL DAILY
Qty: 90 TABLET | Refills: 1 | Status: SHIPPED | OUTPATIENT
Start: 2021-11-02 | End: 2022-05-04 | Stop reason: SDUPTHER

## 2021-11-02 RX ORDER — HYDROCHLOROTHIAZIDE 25 MG/1
25 TABLET ORAL DAILY
Qty: 90 TABLET | Refills: 1 | Status: SHIPPED | OUTPATIENT
Start: 2021-11-02 | End: 2022-05-03 | Stop reason: SDUPTHER

## 2021-11-02 NOTE — PATIENT INSTRUCTIONS
Medicare Wellness  Personal Prevention Plan of Service     Date of Office Visit:  2021  Encounter Provider:  Bryce Soto MD  Place of Service:  [facility] PRIMARY [facility]ARE  Patient Name: Shell Sanford  :  1950    As part of the Medicare Wellness portion of your visit today, we are providing you with this personalized preventive plan of services (PPPS). This plan is based upon recommendations of the [place] Preventive Services Task Force (USPSTF) and the Advisory Committee on Immunization Practices (ACIP).    This lists the preventive care services that should be considered, and provides dates of when you are due. Items listed as completed are up-to-date and do not require any further intervention.    Health Maintenance   Topic Date Due   • DXA SCAN  07/10/2016   • DIABETIC FOOT EXAM  2022   • HEMOGLOBIN A1C  2022   • DIABETIC EYE EXAM  2022   • LIPID PANEL  10/29/2022   • URINE MICROALBUMIN  10/29/2022   • ANNUAL WELLNESS VISIT  2022   • TDAP/TD VACCINES (2 - Td or Tdap) 2023   • COLORECTAL CANCER SCREENING  2026   • COVID-19 Vaccine  Completed   • INFLUENZA VACCINE  Completed   • Pneumococcal Vaccine 65+  Completed   • HEPATITIS C SCREENING  Discontinued   • PAP SMEAR  Discontinued   • ZOSTER VACCINE  Discontinued       Orders Placed This Encounter   Procedures   • Ambulatory Referral to Sleep Medicine     Referral Priority:   Routine     Referred to Provider:   Adilson Rogers MD     Requested Specialty:   Sleep Medicine     Number of Visits Requested:   1       Return in about 6 months (around 2022) for Recheck.

## 2021-11-02 NOTE — PROGRESS NOTES
The ABCs of the Annual Wellness Visit  Subsequent Medicare Wellness Visit    Chief Complaint   Patient presents with   • MEDICARE WELLNESS     DUE  / OK TO DO PER PT    • Hyperlipidemia     LAB RESULTS / MED REFILL DUE     • Hypothyroidism     patient unsure of medications /    • Diabetes      Subjective    History of Present Illness:  Shell Sanford is a 71 y.o. female who presents for a Subsequent Medicare Wellness Visit.    The following portions of the patient's history were reviewed and   updated as appropriate: allergies, current medications, past family history, past medical history, past social history, past surgical history and problem list.    Compared to one year ago, the patient feels her physical   health is the same.    Compared to one year ago, the patient feels her mental   health is the same.    Recent Hospitalizations:  She was not admitted to the hospital during the last year.       Current Medical Providers:  Patient Care Team:  Bryce Soto MD as PCP - General  Bryce Soto MD as PCP - Family Medicine  Oliva Ram MD as Surgeon (Breast Surgery)    Outpatient Medications Prior to Visit   Medication Sig Dispense Refill   • ALPRAZolam (XANAX) 0.5 MG tablet Take 1 tablet by mouth As Needed for Anxiety. 10 tablet 0   • ARIPiprazole (ABILIFY) 2 MG tablet Take 2 mg by mouth Daily.     • lamoTRIgine (LaMICtal) 200 MG tablet Take 200 mg by mouth Daily.     • omeprazole OTC (PrilOSEC OTC) 20 MG EC tablet Prilosec OTC 20 mg oral tablet,delayed release (DR/EC) take 1 tablet by oral route daily   Active     • venlafaxine XR (EFFEXOR-XR) 75 MG 24 hr capsule Take 75 mg by mouth 3 (Three) Times a Day.     • atorvastatin (Lipitor) 20 MG tablet Lipitor 20 mg oral tablet take 1 tablet (20 mg) by oral route once daily   Active     • fenofibrate micronized (ANTARA) 130 MG capsule TAKE 1 CAPSULE BY MOUTH EVERY MORNING before breakfast 30 capsule 0   • hydroCHLOROthiazide (HYDRODIURIL) 25 MG tablet  "hydrochlorothiazide 25 mg oral tablet take 1 tablet (25 mg) by oral route once daily   Active     • levothyroxine (SYNTHROID, LEVOTHROID) 100 MCG tablet Take 1 tablet by mouth Daily. 30 tablet 5   • losartan (COZAAR) 100 MG tablet losartan 100 mg oral tablet take 1 tablet (100 mg) by oral route once daily   Active       No facility-administered medications prior to visit.       No opioid medication identified on active medication list. I have reviewed chart for other potential  high risk medication/s and harmful drug interactions in the elderly.          Aspirin is not on active medication list.  Aspirin use is not indicated based on review of current medical condition/s. Risk of harm outweighs potential benefits.  .    Patient Active Problem List   Diagnosis   • Essential hypertension   • Hyperlipidemia   • Acquired hypothyroidism   • Bipolar disorder (HCC)   • Hypertensive chronic kidney disease with stage 1 through stage 4 chronic kidney disease, or unspecified chronic kidney disease   • Type 2 diabetes mellitus without complication, without long-term current use of insulin (HCC)   • Family history of colon cancer     Advance Care Planning  Advance Directive is not on file.  ACP discussion was held with the patient during this visit. Patient does not have an advance directive, information provided.          Objective    Vitals:    11/02/21 0934   BP: 108/82   Pulse: 90   Resp: 18   Temp: 96.8 °F (36 °C)   TempSrc: Oral   Weight: 72.1 kg (159 lb)   Height: 160 cm (63\")   PainSc: 0-No pain     BMI Readings from Last 1 Encounters:   11/02/21 28.17 kg/m²   BMI is above normal parameters. Recommendations include: exercise counseling    Does the patient have evidence of cognitive impairment? No    Physical Exam  Lab Results   Component Value Date    CHLPL 182 10/29/2021    TRIG 113 10/29/2021    HDL 58 10/29/2021     (H) 10/29/2021    VLDL 20 10/29/2021    HGBA1C 6.7 (H) 10/29/2021            HEALTH RISK " ASSESSMENT    Smoking Status:  Social History     Tobacco Use   Smoking Status Current Every Day Smoker   • Packs/day: 0.50   • Types: Cigarettes   • Start date: 1980   Smokeless Tobacco Never Used     Alcohol Consumption:  Social History     Substance and Sexual Activity   Alcohol Use No     Fall Risk Screen:    JACINTO Fall Risk Assessment has not been completed.    Depression Screening:  PHQ-2/PHQ-9 Depression Screening 11/2/2021   Little interest or pleasure in doing things 0   Feeling down, depressed, or hopeless 0   Trouble falling or staying asleep, or sleeping too much 0   Feeling tired or having little energy 0   Poor appetite or overeating 0   Feeling bad about yourself - or that you are a failure or have let yourself or your family down 0   Trouble concentrating on things, such as reading the newspaper or watching television 0   Moving or speaking so slowly that other people could have noticed. Or the opposite - being so fidgety or restless that you have been moving around a lot more than usual 0   Thoughts that you would be better off dead, or of hurting yourself in some way 0   Total Score 0   If you checked off any problems, how difficult have these problems made it for you to do your work, take care of things at home, or get along with other people? Not difficult at all       Health Habits and Functional and Cognitive Screening:  Functional & Cognitive Status 11/2/2021   Do you have difficulty preparing food and eating? No   Do you have difficulty bathing yourself, getting dressed or grooming yourself? No   Do you have difficulty using the toilet? No   Do you have difficulty moving around from place to place? No   Do you have trouble with steps or getting out of a bed or a chair? No   Current Diet Well Balanced Diet   Dental Exam Up to date   Eye Exam Up to date   Exercise (times per week) 3 times per week   Current Exercises Include Walking   Current Exercise Activities Include -   Do you need help  using the phone?  No   Are you deaf or do you have serious difficulty hearing?  No   Do you need help with transportation? No   Do you need help shopping? No   Do you need help preparing meals?  No   Do you need help with housework?  No   Do you need help with laundry? No   Do you need help taking your medications? No   Do you need help managing money? No   Do you ever drive or ride in a car without wearing a seat belt? No   Have you felt unusual stress, anger or loneliness in the last month? No   Who do you live with? Child   If you need help, do you have trouble finding someone available to you? Yes   Have you been bothered in the last four weeks by sexual problems? No   Do you have difficulty concentrating, remembering or making decisions? No       Age-appropriate Screening Schedule:  Refer to the list below for future screening recommendations based on patient's age, sex and/or medical conditions. Orders for these recommended tests are listed in the plan section. The patient has been provided with a written plan.    Health Maintenance   Topic Date Due   • DXA SCAN  07/10/2016   • DIABETIC FOOT EXAM  04/28/2022   • HEMOGLOBIN A1C  04/29/2022   • DIABETIC EYE EXAM  06/29/2022   • LIPID PANEL  10/29/2022   • URINE MICROALBUMIN  10/29/2022   • TDAP/TD VACCINES (2 - Td or Tdap) 01/01/2023   • INFLUENZA VACCINE  Completed   • PAP SMEAR  Discontinued   • ZOSTER VACCINE  Discontinued              Assessment/Plan   CMS Preventative Services Quick Reference  Risk Factors Identified During Encounter  Cardiovascular Disease  The above risks/problems have been discussed with the patient.  Follow up actions/plans if indicated are seen below in the Assessment/Plan Section.  Pertinent information has been shared with the patient in the After Visit Summary.    Diagnoses and all orders for this visit:    1. Medicare annual wellness visit, subsequent (Primary)    2. Type 2 diabetes mellitus without complication, without long-term  current use of insulin (HCC)    3. Mixed hyperlipidemia  -     fenofibrate micronized (ANTARA) 130 MG capsule; Take 1 capsule by mouth Every Morning Before Breakfast.  Dispense: 90 capsule; Refill: 1  -     atorvastatin (Lipitor) 20 MG tablet; Take 1 tablet by mouth Daily.  Dispense: 90 tablet; Refill: 1    4. Acquired hypothyroidism  -     levothyroxine (SYNTHROID, LEVOTHROID) 100 MCG tablet; Take 1 tablet by mouth Daily.  Dispense: 90 tablet; Refill: 1    5. Sleep arousal disorder  -     Ambulatory Referral to Sleep Medicine    6. Essential hypertension  -     losartan (COZAAR) 100 MG tablet; Take 1 tablet by mouth Daily.  Dispense: 90 tablet; Refill: 1  -     hydroCHLOROthiazide (HYDRODIURIL) 25 MG tablet; Take 1 tablet by mouth Daily.  Dispense: 90 tablet; Refill: 1        Follow Up:   No follow-ups on file.     An After Visit Summary and PPPS were made available to the patient.        I spent 10 minutes caring for Shell on this date of service. This time includes time spent by me in the following activities:preparing for the visit

## 2021-11-02 NOTE — PROGRESS NOTES
Subjective   Shell Sanford is a 71 y.o. female.     History of Present Illness     Chief Complaint:   Chief Complaint   Patient presents with   • MEDICARE WELLNESS     DUE  / OK TO DO PER PT    • Hyperlipidemia     LAB RESULTS / MED REFILL DUE     • Hypothyroidism     patient unsure of medications /    • Diabetes       Shell Sanford 71 y.o. female who presents today for Medical Management of the below listed issues and medication refills. She  has a problem list of   Patient Active Problem List   Diagnosis   • Essential hypertension   • Hyperlipidemia   • Acquired hypothyroidism   • Bipolar disorder (HCC)   • Hypertensive chronic kidney disease with stage 1 through stage 4 chronic kidney disease, or unspecified chronic kidney disease   • Type 2 diabetes mellitus without complication, without long-term current use of insulin (HCC)   • Family history of colon cancer   .  Since the last visit, She has overall felt well, although has longstanding sleep/wake issues for many many years.   she has been compliant with   Current Outpatient Medications:   •  ALPRAZolam (XANAX) 0.5 MG tablet, Take 1 tablet by mouth As Needed for Anxiety., Disp: 10 tablet, Rfl: 0  •  ARIPiprazole (ABILIFY) 2 MG tablet, Take 2 mg by mouth Daily., Disp: , Rfl:   •  atorvastatin (Lipitor) 20 MG tablet, Take 1 tablet by mouth Daily., Disp: 90 tablet, Rfl: 1  •  fenofibrate micronized (ANTARA) 130 MG capsule, Take 1 capsule by mouth Every Morning Before Breakfast., Disp: 90 capsule, Rfl: 1  •  hydroCHLOROthiazide (HYDRODIURIL) 25 MG tablet, Take 1 tablet by mouth Daily., Disp: 90 tablet, Rfl: 1  •  lamoTRIgine (LaMICtal) 200 MG tablet, Take 200 mg by mouth Daily., Disp: , Rfl:   •  levothyroxine (SYNTHROID, LEVOTHROID) 100 MCG tablet, Take 1 tablet by mouth Daily., Disp: 90 tablet, Rfl: 1  •  losartan (COZAAR) 100 MG tablet, Take 1 tablet by mouth Daily., Disp: 90 tablet, Rfl: 1  •  omeprazole OTC (PrilOSEC OTC) 20 MG EC tablet, Prilosec OTC 20 mg  "oral tablet,delayed release (DR/EC) take 1 tablet by oral route daily   Active, Disp: , Rfl:   •  venlafaxine XR (EFFEXOR-XR) 75 MG 24 hr capsule, Take 75 mg by mouth 3 (Three) Times a Day., Disp: , Rfl: .  She denies medication side effects.    All of the other chronic condition(s) listed above are stable w/o issues.    /82   Pulse 90   Temp 96.8 °F (36 °C) (Oral)   Resp 18   Ht 160 cm (63\")   Wt 72.1 kg (159 lb)   LMP  (LMP Unknown)   BMI 28.17 kg/m²     Results for orders placed or performed in visit on 10/26/21   MicroAlbumin, Urine, Random - Urine, Clean Catch    Specimen: Urine, Clean Catch   Result Value Ref Range    Microalbumin, Urine 71.1 Not Estab. ug/mL   Hemoglobin A1c    Specimen: Blood   Result Value Ref Range    Hemoglobin A1C 6.7 (H) 4.8 - 5.6 %   Comprehensive metabolic panel    Specimen: Blood   Result Value Ref Range    Glucose 111 (H) 65 - 99 mg/dL    BUN 23 8 - 27 mg/dL    Creatinine 1.20 (H) 0.57 - 1.00 mg/dL    eGFR Non African Am 45 (L) >59 mL/min/1.73    eGFR African Am 52 (L) >59 mL/min/1.73    BUN/Creatinine Ratio 19 12 - 28    Sodium 142 134 - 144 mmol/L    Potassium 4.9 3.5 - 5.2 mmol/L    Chloride 103 96 - 106 mmol/L    Total CO2 21 20 - 29 mmol/L    Calcium 9.6 8.7 - 10.3 mg/dL    Total Protein 7.5 6.0 - 8.5 g/dL    Albumin 4.7 3.7 - 4.7 g/dL    Globulin 2.8 1.5 - 4.5 g/dL    A/G Ratio 1.7 1.2 - 2.2    Total Bilirubin 0.4 0.0 - 1.2 mg/dL    Alkaline Phosphatase 98 44 - 121 IU/L    AST (SGOT) 26 0 - 40 IU/L    ALT (SGPT) 22 0 - 32 IU/L   Lipid panel    Specimen: Blood   Result Value Ref Range    Total Cholesterol 182 100 - 199 mg/dL    Triglycerides 113 0 - 149 mg/dL    HDL Cholesterol 58 >39 mg/dL    VLDL Cholesterol Jonnathan 20 5 - 40 mg/dL    LDL Chol Calc (NIH) 104 (H) 0 - 99 mg/dL   TSH    Specimen: Blood   Result Value Ref Range    TSH 0.306 (L) 0.450 - 4.500 uIU/mL   T4, Free    Specimen: Blood   Result Value Ref Range    Free T4 1.63 0.82 - 1.77 ng/dL   CBC and " Differential    Specimen: Blood   Result Value Ref Range    WBC 9.5 3.4 - 10.8 x10E3/uL    RBC 4.79 3.77 - 5.28 x10E6/uL    Hemoglobin 13.8 11.1 - 15.9 g/dL    Hematocrit 41.2 34.0 - 46.6 %    MCV 86 79 - 97 fL    MCH 28.8 26.6 - 33.0 pg    MCHC 33.5 31.5 - 35.7 g/dL    RDW 13.0 11.7 - 15.4 %    Platelets 391 150 - 450 x10E3/uL    Neutrophil Rel % 63 Not Estab. %    Lymphocyte Rel % 27 Not Estab. %    Monocyte Rel % 6 Not Estab. %    Eosinophil Rel % 3 Not Estab. %    Basophil Rel % 1 Not Estab. %    Neutrophils Absolute 6.0 1.4 - 7.0 x10E3/uL    Lymphocytes Absolute 2.5 0.7 - 3.1 x10E3/uL    Monocytes Absolute 0.6 0.1 - 0.9 x10E3/uL    Eosinophils Absolute 0.2 0.0 - 0.4 x10E3/uL    Basophils Absolute 0.1 0.0 - 0.2 x10E3/uL    Immature Granulocyte Rel % 0 Not Estab. %    Immature Grans Absolute 0.0 0.0 - 0.1 x10E3/uL             The following portions of the patient's history were reviewed and updated as appropriate: allergies, current medications, past family history, past medical history, past social history, past surgical history, and problem list.    Review of Systems   Constitutional: Positive for fatigue. Negative for activity change, chills and fever.   Respiratory: Negative for cough.    Cardiovascular: Negative for chest pain.   Psychiatric/Behavioral: Negative for dysphoric mood.       Objective   Physical Exam  Constitutional:       General: She is not in acute distress.     Appearance: She is well-developed.   Cardiovascular:      Rate and Rhythm: Normal rate and regular rhythm.   Pulmonary:      Effort: Pulmonary effort is normal.      Breath sounds: Normal breath sounds.   Neurological:      Mental Status: She is alert and oriented to person, place, and time.   Psychiatric:         Behavior: Behavior normal.         Thought Content: Thought content normal.     Labs reviewed with pt today during visit. All questions answered.        Assessment/Plan   Diagnoses and all orders for this visit:    1.  Medicare annual wellness visit, subsequent (Primary)    2. Type 2 diabetes mellitus without complication, without long-term current use of insulin ([facility])    3. Mixed hyperlipidemia  -     fenofibrate micronized (ANTARA) 130 MG capsule; Take 1 capsule by mouth Every Morning Before Breakfast.  Dispense: 90 capsule; Refill: 1  -     atorvastatin (Lipitor) 20 MG tablet; Take 1 tablet by mouth Daily.  Dispense: 90 tablet; Refill: 1    4. Acquired hypothyroidism  -     levothyroxine (SYNTHROID, LEVOTHROID) 100 MCG tablet; Take 1 tablet by mouth Daily.  Dispense: 90 tablet; Refill: 1    5. Sleep arousal disorder  -     Ambulatory Referral to Sleep Medicine    6. Essential hypertension  -     losartan (COZAAR) 100 MG tablet; Take 1 tablet by mouth Daily.  Dispense: 90 tablet; Refill: 1  -     hydroCHLOROthiazide (HYDRODIURIL) 25 MG tablet; Take 1 tablet by mouth Daily.  Dispense: 90 tablet; Refill: 1    Diet/exercise/weight management to treat the glucose discussed.

## 2021-11-08 DIAGNOSIS — I10 ESSENTIAL HYPERTENSION: Chronic | ICD-10-CM

## 2021-11-08 DIAGNOSIS — E78.2 MIXED HYPERLIPIDEMIA: Chronic | ICD-10-CM

## 2021-11-08 RX ORDER — LOSARTAN POTASSIUM 25 MG/1
25 TABLET ORAL DAILY
Qty: 90 TABLET | Refills: 1 | Status: SHIPPED | OUTPATIENT
Start: 2021-11-08 | End: 2022-05-03 | Stop reason: SDUPTHER

## 2021-11-08 RX ORDER — ATORVASTATIN CALCIUM 40 MG/1
40 TABLET, FILM COATED ORAL DAILY
Qty: 90 TABLET | Refills: 1 | Status: SHIPPED | OUTPATIENT
Start: 2021-11-08 | End: 2022-05-04 | Stop reason: SDUPTHER

## 2022-01-05 ENCOUNTER — OFFICE VISIT (OUTPATIENT)
Dept: SLEEP MEDICINE | Facility: HOSPITAL | Age: 72
End: 2022-01-05

## 2022-01-05 VITALS
OXYGEN SATURATION: 96 % | BODY MASS INDEX: 28 KG/M2 | WEIGHT: 158 LBS | SYSTOLIC BLOOD PRESSURE: 117 MMHG | HEART RATE: 95 BPM | HEIGHT: 63 IN | DIASTOLIC BLOOD PRESSURE: 82 MMHG

## 2022-01-05 DIAGNOSIS — G47.33 OBSTRUCTIVE SLEEP APNEA SYNDROME: Primary | ICD-10-CM

## 2022-01-05 DIAGNOSIS — F51.5 NIGHTMARE DISORDER: ICD-10-CM

## 2022-01-05 DIAGNOSIS — E66.3 OVERWEIGHT (BMI 25.0-29.9): ICD-10-CM

## 2022-01-05 DIAGNOSIS — G47.10 HYPERSOMNIA: ICD-10-CM

## 2022-01-05 DIAGNOSIS — G47.8 NON-RESTORATIVE SLEEP: ICD-10-CM

## 2022-01-05 DIAGNOSIS — Z72.821 INADEQUATE SLEEP HYGIENE: ICD-10-CM

## 2022-01-05 PROCEDURE — 99204 OFFICE O/P NEW MOD 45 MIN: CPT | Performed by: FAMILY MEDICINE

## 2022-01-05 PROCEDURE — G0463 HOSPITAL OUTPT CLINIC VISIT: HCPCS

## 2022-01-05 RX ORDER — PRAZOSIN HYDROCHLORIDE 1 MG/1
1 CAPSULE ORAL NIGHTLY
Qty: 30 CAPSULE | Refills: 1 | Status: SHIPPED | OUTPATIENT
Start: 2022-01-05 | End: 2022-05-03
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
## 2022-02-08 ENCOUNTER — APPOINTMENT (OUTPATIENT)
Dept: SLEEP MEDICINE | Facility: HOSPITAL | Age: 72
End: 2022-02-08

## 2022-02-08 ENCOUNTER — TELEPHONE (OUTPATIENT)
Dept: SLEEP MEDICINE | Facility: HOSPITAL | Age: 72
End: 2022-02-08

## 2022-05-03 DIAGNOSIS — I10 ESSENTIAL HYPERTENSION: Chronic | ICD-10-CM

## 2022-05-03 RX ORDER — LOSARTAN POTASSIUM 50 MG/1
50 TABLET ORAL DAILY
Qty: 30 TABLET | Refills: 2 | Status: SHIPPED | OUTPATIENT
Start: 2022-05-03 | End: 2022-05-04 | Stop reason: SDUPTHER

## 2022-05-03 RX ORDER — HYDROCHLOROTHIAZIDE 25 MG/1
25 TABLET ORAL DAILY
Qty: 90 TABLET | Refills: 1 | Status: SHIPPED | OUTPATIENT
Start: 2022-05-03 | End: 2022-05-04 | Stop reason: SDUPTHER

## 2022-05-04 ENCOUNTER — OFFICE VISIT (OUTPATIENT)
Dept: FAMILY MEDICINE CLINIC | Facility: CLINIC | Age: 72
End: 2022-05-04

## 2022-05-04 VITALS
RESPIRATION RATE: 16 BRPM | DIASTOLIC BLOOD PRESSURE: 73 MMHG | HEIGHT: 63 IN | OXYGEN SATURATION: 95 % | HEART RATE: 86 BPM | SYSTOLIC BLOOD PRESSURE: 159 MMHG | BODY MASS INDEX: 28.7 KG/M2 | WEIGHT: 162 LBS | TEMPERATURE: 97.9 F

## 2022-05-04 DIAGNOSIS — I10 ESSENTIAL HYPERTENSION: Chronic | ICD-10-CM

## 2022-05-04 DIAGNOSIS — E78.2 MIXED HYPERLIPIDEMIA: Chronic | ICD-10-CM

## 2022-05-04 DIAGNOSIS — E11.9 TYPE 2 DIABETES MELLITUS WITHOUT COMPLICATION, WITHOUT LONG-TERM CURRENT USE OF INSULIN: Primary | ICD-10-CM

## 2022-05-04 DIAGNOSIS — E03.9 ACQUIRED HYPOTHYROIDISM: Chronic | ICD-10-CM

## 2022-05-04 PROCEDURE — 99214 OFFICE O/P EST MOD 30 MIN: CPT | Performed by: FAMILY MEDICINE

## 2022-05-04 RX ORDER — LOSARTAN POTASSIUM 50 MG/1
50 TABLET ORAL DAILY
Qty: 90 TABLET | Refills: 1 | Status: SHIPPED | OUTPATIENT
Start: 2022-05-04 | End: 2022-11-02

## 2022-05-04 RX ORDER — HYDROCHLOROTHIAZIDE 25 MG/1
25 TABLET ORAL DAILY
Qty: 90 TABLET | Refills: 1 | Status: SHIPPED | OUTPATIENT
Start: 2022-05-04 | End: 2022-11-05 | Stop reason: SDUPTHER

## 2022-05-04 RX ORDER — FENOFIBRATE 130 MG/1
130 CAPSULE ORAL
Qty: 90 CAPSULE | Refills: 1 | Status: SHIPPED | OUTPATIENT
Start: 2022-05-04 | End: 2022-11-02

## 2022-05-04 RX ORDER — ATORVASTATIN CALCIUM 40 MG/1
40 TABLET, FILM COATED ORAL DAILY
Qty: 90 TABLET | Refills: 1 | Status: SHIPPED | OUTPATIENT
Start: 2022-05-04 | End: 2022-11-02

## 2022-05-04 RX ORDER — PRAZOSIN HYDROCHLORIDE 1 MG/1
1 CAPSULE ORAL
COMMUNITY
Start: 2022-01-05 | End: 2022-09-16

## 2022-05-04 RX ORDER — LEVOTHYROXINE SODIUM 0.1 MG/1
100 TABLET ORAL DAILY
Qty: 90 TABLET | Refills: 1 | Status: SHIPPED | OUTPATIENT
Start: 2022-05-04 | End: 2022-11-04

## 2022-05-04 RX ORDER — MELOXICAM 15 MG/1
TABLET ORAL
COMMUNITY
End: 2022-09-16

## 2022-05-04 RX ORDER — DESONIDE 0.5 MG/G
CREAM TOPICAL
COMMUNITY
End: 2022-09-16

## 2022-05-04 NOTE — PROGRESS NOTES
Subjective   Shell Sanford is a 71 y.o. female.     History of Present Illness     Chief Complaint:   Chief Complaint   Patient presents with   • Hypertension   • Hyperlipidemia   • Diabetes   • Hypothyroidism       Shell Sanford 71 y.o. female who presents today for Medical Management of the below listed issues. She  has a problem list of   Patient Active Problem List   Diagnosis   • Essential hypertension   • Hyperlipidemia   • Acquired hypothyroidism   • Bipolar disorder (HCC)   • Hypertensive chronic kidney disease with stage 1 through stage 4 chronic kidney disease, or unspecified chronic kidney disease   • Type 2 diabetes mellitus without complication, without long-term current use of insulin (HCC)   • Family history of colon cancer   .  Since the last visit, She has overall felt well.  she has been compliant with   Current Outpatient Medications:   •  ALPRAZolam (XANAX) 0.5 MG tablet, Take 1 tablet by mouth As Needed for Anxiety., Disp: 10 tablet, Rfl: 0  •  ARIPiprazole (ABILIFY) 2 MG tablet, Take 2 mg by mouth Daily., Disp: , Rfl:   •  atorvastatin (Lipitor) 40 MG tablet, Take 1 tablet by mouth Daily., Disp: 90 tablet, Rfl: 1  •  fenofibrate micronized (ANTARA) 130 MG capsule, Take 1 capsule by mouth Every Morning Before Breakfast., Disp: 90 capsule, Rfl: 1  •  hydroCHLOROthiazide (HYDRODIURIL) 25 MG tablet, Take 1 tablet by mouth Daily., Disp: 90 tablet, Rfl: 1  •  lamoTRIgine (LaMICtal) 200 MG tablet, Take 200 mg by mouth Daily., Disp: , Rfl:   •  levothyroxine (SYNTHROID, LEVOTHROID) 100 MCG tablet, Take 1 tablet by mouth Daily., Disp: 90 tablet, Rfl: 1  •  losartan (COZAAR) 50 MG tablet, Take 1 tablet by mouth Daily., Disp: 90 tablet, Rfl: 1  •  omeprazole OTC (PriLOSEC OTC) 20 MG EC tablet, Prilosec OTC 20 mg oral tablet,delayed release (DR/EC) take 1 tablet by oral route daily   Active, Disp: , Rfl:   •  venlafaxine XR (EFFEXOR-XR) 75 MG 24 hr capsule, Take 75 mg by mouth 3 (Three) Times a Day., Disp:  ", Rfl:   •  desonide (DESOWEN) 0.05 % cream, desonide 0.05 % topical cream  APPLY TO THE AFFECTED AREA(S) TWICE DAILY AS DIRECTED, Disp: , Rfl:   •  meloxicam (MOBIC) 15 MG tablet, meloxicam 15 mg tablet, Disp: , Rfl:   •  prazosin (MINIPRESS) 1 MG capsule, Take 1 mg by mouth., Disp: , Rfl: .  She denies medication side effects.    All of the other chronic condition(s) listed above are stable w/o issues.    /73   Pulse 86   Temp 97.9 °F (36.6 °C) (Skin)   Resp 16   Ht 160 cm (63\")   Wt 73.5 kg (162 lb)   LMP  (LMP Unknown)   SpO2 95%   BMI 28.70 kg/m²     Results for orders placed or performed in visit on 10/26/21   MicroAlbumin, Urine, Random - Urine, Clean Catch    Specimen: Urine, Clean Catch   Result Value Ref Range    Microalbumin, Urine 71.1 Not Estab. ug/mL   Hemoglobin A1c    Specimen: Blood   Result Value Ref Range    Hemoglobin A1C 6.7 (H) 4.8 - 5.6 %   Comprehensive metabolic panel    Specimen: Blood   Result Value Ref Range    Glucose 111 (H) 65 - 99 mg/dL    BUN 23 8 - 27 mg/dL    Creatinine 1.20 (H) 0.57 - 1.00 mg/dL    eGFR Non African Am 45 (L) >59 mL/min/1.73    eGFR African Am 52 (L) >59 mL/min/1.73    BUN/Creatinine Ratio 19 12 - 28    Sodium 142 134 - 144 mmol/L    Potassium 4.9 3.5 - 5.2 mmol/L    Chloride 103 96 - 106 mmol/L    Total CO2 21 20 - 29 mmol/L    Calcium 9.6 8.7 - 10.3 mg/dL    Total Protein 7.5 6.0 - 8.5 g/dL    Albumin 4.7 3.7 - 4.7 g/dL    Globulin 2.8 1.5 - 4.5 g/dL    A/G Ratio 1.7 1.2 - 2.2    Total Bilirubin 0.4 0.0 - 1.2 mg/dL    Alkaline Phosphatase 98 44 - 121 IU/L    AST (SGOT) 26 0 - 40 IU/L    ALT (SGPT) 22 0 - 32 IU/L   Lipid panel    Specimen: Blood   Result Value Ref Range    Total Cholesterol 182 100 - 199 mg/dL    Triglycerides 113 0 - 149 mg/dL    HDL Cholesterol 58 >39 mg/dL    VLDL Cholesterol Jonnathan 20 5 - 40 mg/dL    LDL Chol Calc ([facility]) 104 (H) 0 - 99 mg/dL   TSH    Specimen: Blood   Result Value Ref Range    TSH 0.306 (L) 0.450 - 4.500 uIU/mL   T4, " Free    Specimen: Blood   Result Value Ref Range    Free T4 1.63 0.82 - 1.77 ng/dL   CBC and Differential    Specimen: Blood   Result Value Ref Range    WBC 9.5 3.4 - 10.8 x10E3/uL    RBC 4.79 3.77 - 5.28 x10E6/uL    Hemoglobin 13.8 11.1 - 15.9 g/dL    Hematocrit 41.2 34.0 - 46.6 %    MCV 86 79 - 97 fL    MCH 28.8 26.6 - 33.0 pg    MCHC 33.5 31.5 - 35.7 g/dL    RDW 13.0 11.7 - 15.4 %    Platelets 391 150 - 450 x10E3/uL    Neutrophil Rel % 63 Not Estab. %    Lymphocyte Rel % 27 Not Estab. %    Monocyte Rel % 6 Not Estab. %    Eosinophil Rel % 3 Not Estab. %    Basophil Rel % 1 Not Estab. %    Neutrophils Absolute 6.0 1.4 - 7.0 x10E3/uL    Lymphocytes Absolute 2.5 0.7 - 3.1 x10E3/uL    Monocytes Absolute 0.6 0.1 - 0.9 x10E3/uL    Eosinophils Absolute 0.2 0.0 - 0.4 x10E3/uL    Basophils Absolute 0.1 0.0 - 0.2 x10E3/uL    Immature Granulocyte Rel % 0 Not Estab. %    Immature Grans Absolute 0.0 0.0 - 0.1 x10E3/uL             The following portions of the patient's history were reviewed and updated as appropriate: allergies, current medications, past family history, past medical history, past social history, past surgical history, and problem list.    Review of Systems   Constitutional: Negative for activity change, chills and fever.   Respiratory: Negative for cough.    Cardiovascular: Negative for chest pain.   Psychiatric/Behavioral: Negative for dysphoric mood.       Objective   Physical Exam  Constitutional:       General: She is not in acute distress.     Appearance: She is well-developed.   Cardiovascular:      Rate and Rhythm: Normal rate and regular rhythm.   Pulmonary:      Effort: Pulmonary effort is normal.      Breath sounds: Normal breath sounds.   Neurological:      Mental Status: She is alert and oriented to person, place, and time.   Psychiatric:         Behavior: Behavior normal.         Thought Content: Thought content normal.           Assessment/Plan   Diagnoses and all orders for this visit:    1.  Type 2 diabetes mellitus without complication, without long-term current use of insulin (HCC) (Primary)  -     Basic Metabolic Panel  -     Hemoglobin A1c    2. Essential hypertension  -     losartan (COZAAR) 50 MG tablet; Take 1 tablet by mouth Daily.  Dispense: 90 tablet; Refill: 1  -     hydroCHLOROthiazide (HYDRODIURIL) 25 MG tablet; Take 1 tablet by mouth Daily.  Dispense: 90 tablet; Refill: 1    3. Acquired hypothyroidism  -     levothyroxine (SYNTHROID, LEVOTHROID) 100 MCG tablet; Take 1 tablet by mouth Daily.  Dispense: 90 tablet; Refill: 1    4. Mixed hyperlipidemia  -     fenofibrate micronized (ANTARA) 130 MG capsule; Take 1 capsule by mouth Every Morning Before Breakfast.  Dispense: 90 capsule; Refill: 1  -     atorvastatin (Lipitor) 40 MG tablet; Take 1 tablet by mouth Daily.  Dispense: 90 tablet; Refill: 1

## 2022-05-05 LAB
BUN SERPL-MCNC: 23 MG/DL (ref 8–27)
BUN/CREAT SERPL: 17 (ref 12–28)
CALCIUM SERPL-MCNC: 9.7 MG/DL (ref 8.7–10.3)
CHLORIDE SERPL-SCNC: 106 MMOL/L (ref 96–106)
CO2 SERPL-SCNC: 22 MMOL/L (ref 20–29)
CREAT SERPL-MCNC: 1.33 MG/DL (ref 0.57–1)
EGFRCR SERPLBLD CKD-EPI 2021: 43 ML/MIN/1.73
GLUCOSE SERPL-MCNC: 141 MG/DL (ref 65–99)
HBA1C MFR BLD: 7 % (ref 4.8–5.6)
POTASSIUM SERPL-SCNC: 4.5 MMOL/L (ref 3.5–5.2)
SODIUM SERPL-SCNC: 145 MMOL/L (ref 134–144)

## 2022-05-10 ENCOUNTER — PATIENT OUTREACH (OUTPATIENT)
Dept: CASE MANAGEMENT | Facility: OTHER | Age: 72
End: 2022-05-10

## 2022-05-10 DIAGNOSIS — I12.9 HYPERTENSIVE CHRONIC KIDNEY DISEASE WITH STAGE 1 THROUGH STAGE 4 CHRONIC KIDNEY DISEASE, OR UNSPECIFIED CHRONIC KIDNEY DISEASE: ICD-10-CM

## 2022-05-10 DIAGNOSIS — E11.9 TYPE 2 DIABETES MELLITUS WITHOUT COMPLICATION, WITHOUT LONG-TERM CURRENT USE OF INSULIN: Primary | ICD-10-CM

## 2022-05-10 NOTE — OUTREACH NOTE
AMBULATORY CASE MANAGEMENT NOTE    Name and Relationship of Patient/Support Person: Shell Sanford - Self    Patient returned call to RN-ACM.  Introduced self and explained role of RN-ACM.  Discussed CCM and services.  Patient requested a brochure to be mailed to her home.  She declines other outreaches at this time.  She declines CCM and HRCM at this time.      DAVID HOWARD  Ambulatory Case Management    5/10/2022, 15:47 EDT

## 2022-06-02 DIAGNOSIS — F51.01 PRIMARY INSOMNIA: ICD-10-CM

## 2022-06-02 RX ORDER — HYDROXYZINE HYDROCHLORIDE 25 MG/1
TABLET, FILM COATED ORAL
Qty: 60 TABLET | Refills: 11 | OUTPATIENT
Start: 2022-06-02

## 2022-09-16 ENCOUNTER — OFFICE VISIT (OUTPATIENT)
Dept: PLASTIC SURGERY | Facility: CLINIC | Age: 72
End: 2022-09-16

## 2022-09-16 VITALS — HEIGHT: 63 IN | BODY MASS INDEX: 28.7 KG/M2

## 2022-09-16 DIAGNOSIS — R23.8 FACIAL AGING: Primary | ICD-10-CM

## 2022-09-16 PROBLEM — M21.619 BUNION: Status: ACTIVE | Noted: 2022-09-16

## 2022-09-16 PROBLEM — L84 CORNS AND CALLUS: Status: ACTIVE | Noted: 2022-09-16

## 2022-09-16 PROBLEM — C50.919 BREAST CANCER (HCC): Status: ACTIVE | Noted: 2022-09-16

## 2022-09-16 PROCEDURE — COS47: Performed by: NURSE PRACTITIONER

## 2022-09-16 PROCEDURE — COS63: Performed by: NURSE PRACTITIONER

## 2022-09-16 NOTE — PROGRESS NOTES
"Consult (Fine lines and wrinkles around mouth)     {Problem List  Visit Diagnosis   Encounters  Notes  Medications  Labs  Result Review Imaging  Media :23}       History of Present Illness  Shell Sanford is a 71 y.o. female who presents to [facility] as a consult for fillers.  Dr. Guero Stone (plastic surgeon) in [place] done  past about 4-5 years ago. Complains of fine lines and wrinkles around mouth. Started back smoking and is trying to quit.    Never has had Botox.  Uses Izabella lauder, and Neutrogena products and hyaluronic acid.    No history of cold sores.    Subjective       Keflet [cephalexin], Penicillins, Pramoxine-benzalkonium cl, Sulfamethoxazole-trimethoprim, and Neosporin [neomycin-polymyxin-gramicidin]  Allergies Reconciled.    Review of Systems    All review of system has been reviewed and it  is negative except the ones note above.     Objective     Ht 160 cm (62.99\")   BMI 28.70 kg/m²     Body mass index is 28.7 kg/m².    Physical Exam    Result Review :{Labs  Result Review  Imaging  Med Tab  Media :23}   {The following data was reviewed by (Optional):[id number]}    Procedures         Assessment and Plan {CC Problem List  Visit Diagnosis  ROS  Review (Popup)  Health Maintenance  Quality  BestPractice  Medications  SmartSets  SnapShot Encounters  Media :23}     There are no diagnoses linked to this encounter.    Plan:  • ***    {Time Spent (Optional):09506}    Follow Up {Instructions Charge Capture  Follow-up Communications :23}    No follow-ups on file.    Patient was given instructions and counseling regarding her condition. Please see specific information pulled into the AVS if appropriate.     Juliana Fox MA  09/16/2022  "

## 2022-09-16 NOTE — PROGRESS NOTES
"Chief Complaint  Consult (Fine lines and wrinkles around mouth)    Subjective          History of Present Illness  Shell Sanford is a 71 y.o. female who presents to [facility] as a consult for Dysport injection and to discuss filler injection.    She has never had botox before. Patient had seen Dr. Guero Stone (plastic surgeon) in [place] and had lip  past about 4-5 years ago. Complains of fine lines and wrinkles around mouth. Started back smoking and is trying to quit.    Uses Izabella lauder, and Neutrogena products and hyaluronic acid.    No history of cold sores. Is allergic to bacitracin. Is needing Aquaphor with any procedure.    Patient states she does not want cheek filler but will think about it. She is wanting to wait and go slow first and start with her lips and then later on she may decide to do cheek filler.      Allergies: Cephalexin, Neomycin-bacitracin zn-polymyx, Penicillins, Pramoxine-benzalkonium cl, Sulfamethoxazole-trimethoprim, and Neosporin [neomycin-polymyxin-gramicidin]  Allergies Reconciled.    Review of Systems   All review of system has been reviewed and it  is negative except the ones note above.     Objective     Ht 160 cm (62.99\")   BMI 28.70 kg/m²     Body mass index is 28.7 kg/m².    Physical Exam  Head and Face  Dynamic greater than static rhytids of forehead, glabella, and periorbital areas, vertical cutaneous lip lines, decreased fullness of the lip with vertical lines, marionette lines prominent, no open areas or irritation        Result Review :     Botulinum Injection    Date/Time: 9/16/2022 2:21 PM  Performed by: Donya Odom APRN  Authorized by: Donya Odom APRN       Consent:      Consent obtained:  Written     Consent given by:  Patient     Risks discussed:  Dysphagia, weakness, poor cosmetic result, pain, infection, bleeding and bruising    Procedure details:      The procedure was cosmetic.    " Medication(s) used: Dysport       Total units available:  12     Total units wasted:  12      • Photos were obtained.  • The indications, benefits, risks, alternatives, expected outcomes, and complications as to the application of Botulinum toxin/Dysport to the face was discussed with the patient.  Informed consent was obtained.  They understand, accept risks, consent and wish to proceed.   • Description:  In the exam room, patient's face was prepped with alcohol.  Using Botox in a 1:1 ration (Dysport in a 3:1 ration), periorbital, glabella, and forehead were injected.  The patient tolerated the procedure well with no immediate complications.  Post procedure instructions were given.  After injection, the areas were gently massaged. The patient was given an icepack.  • Lot#: [id number]  • Exp#: 03/31/2023  • NDC: Dysport: 3793-0958-75  • Total Units Used: 12 units  • Forehead:  units  • Glabella:  units  • Right Periorbital:  units  • Left Periorbital: units  • Right Perioral: 6 units  • Left Perioral: 6 units       Assessment and Plan      Diagnoses and all orders for this visit:    1. Facial aging (Primary)    Other orders  -     Botulinum Injection        Plan:  Discussed Dysport to upper lip today to help with vertical lines and will plan dermal filler to lip (1-2 syringes of Kysse) later, may have one syringe in 2 weeks. She would like filler to lips (small amount to smooth), upper lip vertical lines and marionette area. She has very good skin quality and is on a skin regimen.   She was given information sheets on filler and neurotoxin.   Dysport  The patient tolerated the procedure well with no immediate complications. The patient will follow-up in 2 weeks for botox injection and do filler injection at this visit. May call office with any concerns or questions. Patient was given post procedure instructions for filler injection to lips.    Scribed by Juliana Fox MA, acting as a scribe for ORTEGA Bernal,  09/16/22 14:12 EDT.  ORTEGA Bernal's signature on the note affirms that the note adequately documents the care provided.      Patient was given instructions and counseling regarding her condition. Please see specific information pulled into the AVS if appropriate.     ORTEGA Bernal  09/16/2022

## 2022-09-22 NOTE — PROGRESS NOTES
Chief Complaint  No chief complaint on file.    Subjective     {Problem List  Visit Diagnosis   Encounters  Notes  Medications  Labs  Result Review Imaging  Media :23}     History of Present Illness  Shell Sanford is a 71 y.o. female who presents to [facility]ERY as a follow up for Botox Injection.. She had 12 units of       Allergies: Cephalexin, Neomycin-bacitracin zn-polymyx, Penicillins, Pramoxine-benzalkonium cl, Sulfamethoxazole-trimethoprim, and Neosporin [neomycin-polymyxin-gramicidin]  Allergies Reconciled.    Review of Systems   All review of system has been reviewed and it  is negative except the ones note above.     Objective     There were no vitals taken for this visit.    There is no height or weight on file to calculate BMI.    Physical Exam  Head and Face  {exam options botox/dysport:04107}      Result Review :{Labs  Result Review  Imaging  Med Tab  Media :23}   {The following data was reviewed by (Optional):[id number]}  Procedures  • Photos were obtained.  • The indications, benefits, risks, alternatives, expected outcomes, and complications as to the application of Botulinum toxin/Dysport to the face was discussed with the patient.  Informed consent was obtained.  They understand, accept risks, consent and wish to proceed.   • Description:  In the exam room, patient's face was prepped with alcohol.  Using Botox in a 1:1 ration (Dysport in a 3:1 ration), periorbital, glabella, and forehead were injected.  The patient tolerated the procedure well with no immediate complications.  Post procedure instructions were given.  After injection, the areas were gently massaged. The patient was given an icepack.  • Lot#: [id number]  • Exp#: 10/31/2023  • NDC: Botox: 5155-1277-68, Botox Therapeutic: 5757-1408-43, Dysport NDC: 6984-7463-58  • Total Units Used:  • Forehead:  units  • Glabella:  units  • Right Periorbital:  units  • Left Periorbital: units  • Right  Perioral: units  • Left Perioral: units       Assessment and Plan {CC Problem List  Visit Diagnosis  ROS  Review (Popup)  Health Maintenance  Quality  BestPractice  Medications  SmartSets  SnapShot Encounters  Media :23}     There are no diagnoses linked to this encounter.    Plan:  ***    {Time Spent (Optional):31766}    Follow Up {Instructions Charge Capture  Follow-up Communications :23}    No follow-ups on file.    Patient was given instructions and counseling regarding her condition. Please see specific information pulled into the AVS if appropriate.     Nora Waite  09/28/2022

## 2022-09-28 ENCOUNTER — PROCEDURE VISIT (OUTPATIENT)
Dept: PLASTIC SURGERY | Facility: CLINIC | Age: 72
End: 2022-09-28

## 2022-09-28 VITALS — TEMPERATURE: 98.4 F

## 2022-09-28 DIAGNOSIS — R23.8 FACIAL AGING: Primary | ICD-10-CM

## 2022-09-28 PROCEDURE — COS77: Performed by: NURSE PRACTITIONER

## 2022-09-28 NOTE — PROGRESS NOTES
Chief Complaint  Procedure (Follow up on Dysport and Filler (Kysse) injection today)    Subjective            History of Present Illness  Shell Sanford is a 71 y.o. female who presents to [facility] SURG[facility] for Restylane kysse injection to nasal labial folds and vertical lip lines.    She complains of lip lines.  She does not have a history of Accutane use.  She does not have a history of cold sores.        Allergies: Cephalexin, Neomycin-bacitracin zn-polymyx, Penicillins, Pramoxine-benzalkonium cl, Sulfamethoxazole-trimethoprim, and Neosporin [neomycin-polymyxin-gramicidin]  Allergies Reconciled.    Review of Systems  All system were reviewed and were negative, except the ones noted above.     Review of Systems     Objective     Temp 98.4 °F (36.9 °C) (Temporal)     There is no height or weight on file to calculate BMI.    Physical Exam    Cardiovascular: Normal rate    Pulmonary/Chest: Effort normal    Head and Face: No open areas, facial skin intact,  mid face volume loss, moderately deep nasal labial folds, marionettes, and vertical lip lines; decreased upper lip to lower lip ratio         Result Review :     Filler Injection  Performed by: Donya Odom APRN  Authorized by: Donya Odom APRN        Restylane Kiss filler is what was injected.    The indications, benefits, risks, alternatives, expected outcomes and complications in regards to the injection of Restylane Kiss filler was discussed with the patient.    1st Restylane Kiss Lot #:  [id number]    1st Restylane Kiss Expiration Date:  10/31/2023    1st Restylane Kiss Number of syringes:  1  I then proceeded to inject the Prominent NL folds with filler, taking care to aspirate prior to injection in small aliquots.   Post-procedure instructions were provided     informed consent was obtained   Patient expressed understanding, accepts risks, consent and wishes to proceed  Site prep:  Chloraprep  Toleration of  procedure:  Well, with no immediate complications, Appropriate bruising and Appropriate swelling  Follow-up:  2 weeks      • Photos were obtained.    Procedure Description:   Filler Injection: The indications, benefits, risks, alternatives, expected outcomes, and complications as to the injection of Restylane Kysse was discussed with the patient.  Informed consent was obtained.  They understand, accept risks, consent and wish to proceed.  Lidocaine cream was applied to face and allowed to sit for 30 minutes.  After this time the area was prepped with ChloraPrep.  I then proceeded to inject the prominent NL folds with filler, taking care to aspirate prior to injection and injected in small aliquots. Patient tolerated procedure well with no immediate complications.  Ice pack was then applied and postoperative instructions were given. The patient will follow-up in 2 weeks.           Assessment and Plan      Diagnoses and all orders for this visit:    1. Facial aging (Primary)    Other orders  -     Filler Injection        Plan:  The patient tolerated the procedure well with no immediate complications. The patient was given an ice pack and post procedure instructions. The patient will follow-up in 2 weeks. May call office with any concerns or questions.      Scribed by Juliana Fox MA, acting as a scribe for ORTEGA Bernal, 09/28/22 11:25 EDT.  ORTEGA Bernal's signature on the note affirms that the note adequately documents the care provided.  Patient was given instructions and counseling regarding her condition. Please see specific information pulled into the AVS if appropriate.     ORTEGA Bernal  09/28/2022

## 2022-10-04 NOTE — PROGRESS NOTES
Chief Complaint  Follow-up (Restylane follow up)    Subjective            History of Present Illness  Shell Sanford is a 72 y.o. female who presents to [facility] SURGERY as a follow up after Dermal Filler 09/28/22. She had Restylane Kysse. Patient states the first 2 weeks it looked great. She feels like it has faded and would need a little more. Would like more volume.          Allergies: Cephalexin, Neomycin-bacitracin zn-polymyx, Penicillins, Pramoxine-benzalkonium cl, Sulfamethoxazole-trimethoprim, and Neosporin [neomycin-polymyxin-gramicidin]  Allergies Reconciled.    Review of Systems  All system were reviewed and were negative, except the ones noted above.     Review of Systems     Objective     There were no vitals taken for this visit.    There is no height or weight on file to calculate BMI.    Physical Exam    Cardiovascular: Normal rate    Pulmonary/Chest: Effort normal    Head and Face: No open areas, facial skin intact,  mid face volume loss, improved moderately deep nasal labial folds, marionettes, and vertical lip lines        Result Review :     Procedures  • Photos were obtained.    Procedure Description:   Filler Injection: The indications, benefits, risks, alternatives, expected outcomes, and complications as to the injection of Ultra was discussed with the patient.  Informed consent was obtained.  They understand, accept risks, consent and wish to proceed.  Lidocaine cream was applied to face and allowed to sit for 30 minutes.  After this time the area was prepped with ChloraPrep.  I then proceeded to inject the prominent NL folds and lines lateral to the corner of the mouth with filler, taking care to aspirate prior to injection and injected in small aliquots. Patient tolerated procedure well with no immediate complications.  Ice pack was then applied and postoperative instructions were given. The patient will follow-up in 2 weeks.           Assessment  and Plan      Diagnoses and all orders for this visit:    1. Facial aging (Primary)        Plan    The patient tolerated the procedure well with no immediate complications. The patient was given an ice pack and post procedure instructions. The patient will follow-up in 2 weeks. May call office with any concerns or questions.        Scribed by ORTEGA Bernal, acting as a scribe for ORTEGA Bernal, 10/13/22 11:14 EDT.  ORTEGA Bernal's signature on the note affirms that the note adequately documents the care provided.        Patient was given instructions and counseling regarding her condition. Please see specific information pulled into the AVS if appropriate.     ORTEGA Bernal  10/13/2022

## 2022-10-13 ENCOUNTER — OFFICE VISIT (OUTPATIENT)
Dept: PLASTIC SURGERY | Facility: CLINIC | Age: 72
End: 2022-10-13

## 2022-10-13 DIAGNOSIS — R23.8 FACIAL AGING: Primary | ICD-10-CM

## 2022-10-13 PROCEDURE — COS68: Performed by: NURSE PRACTITIONER

## 2022-11-01 DIAGNOSIS — E78.2 MIXED HYPERLIPIDEMIA: Chronic | ICD-10-CM

## 2022-11-01 DIAGNOSIS — I10 ESSENTIAL HYPERTENSION: Chronic | ICD-10-CM

## 2022-11-02 DIAGNOSIS — E03.9 ACQUIRED HYPOTHYROIDISM: Chronic | ICD-10-CM

## 2022-11-02 DIAGNOSIS — I10 ESSENTIAL HYPERTENSION: Chronic | ICD-10-CM

## 2022-11-02 DIAGNOSIS — E78.2 MIXED HYPERLIPIDEMIA: Chronic | ICD-10-CM

## 2022-11-02 RX ORDER — LEVOTHYROXINE SODIUM 0.1 MG/1
100 TABLET ORAL DAILY
Qty: 90 TABLET | Refills: 1 | OUTPATIENT
Start: 2022-11-02

## 2022-11-02 RX ORDER — FENOFIBRATE 130 MG/1
130 CAPSULE ORAL
Qty: 30 CAPSULE | Refills: 0 | Status: SHIPPED | OUTPATIENT
Start: 2022-11-02 | End: 2022-11-05 | Stop reason: SDUPTHER

## 2022-11-02 RX ORDER — LOSARTAN POTASSIUM 50 MG/1
50 TABLET ORAL DAILY
Qty: 30 TABLET | Refills: 0 | OUTPATIENT
Start: 2022-11-02

## 2022-11-02 RX ORDER — ATORVASTATIN CALCIUM 40 MG/1
40 TABLET, FILM COATED ORAL DAILY
Qty: 30 TABLET | Refills: 0 | Status: SHIPPED | OUTPATIENT
Start: 2022-11-02 | End: 2022-11-05 | Stop reason: SDUPTHER

## 2022-11-02 RX ORDER — FENOFIBRATE 130 MG/1
130 CAPSULE ORAL
Qty: 30 CAPSULE | Refills: 0 | OUTPATIENT
Start: 2022-11-02

## 2022-11-02 RX ORDER — ATORVASTATIN CALCIUM 40 MG/1
40 TABLET, FILM COATED ORAL DAILY
Qty: 30 TABLET | Refills: 0 | OUTPATIENT
Start: 2022-11-02

## 2022-11-02 RX ORDER — LOSARTAN POTASSIUM 50 MG/1
50 TABLET ORAL DAILY
Qty: 30 TABLET | Refills: 0 | Status: SHIPPED | OUTPATIENT
Start: 2022-11-02 | End: 2022-11-05 | Stop reason: SDUPTHER

## 2022-11-02 NOTE — TELEPHONE ENCOUNTER
Caller: [employer] - [facility] [address] [address] - [phone] [facility] [phone] FX    Relationship: Pharmacy    Best call back number: [phone]    Requested Prescriptions:   Requested Prescriptions     Pending Prescriptions Disp Refills   • atorvastatin (LIPITOR) 40 MG tablet 30 tablet 0     Sig: Take 1 tablet by mouth Daily.   • fenofibrate micronized (ANTARA) 130 MG capsule 30 capsule 0     Sig: Take 1 capsule by mouth Every Morning Before Breakfast.   • losartan (COZAAR) 50 MG tablet 30 tablet 0     Sig: Take 1 tablet by mouth Daily.   • levothyroxine (SYNTHROID, LEVOTHROID) 100 MCG tablet 90 tablet 1     Sig: Take 1 tablet by mouth Daily.        Pharmacy where request should be sent: [facility] - [facility] [address] [address] - [phone] [facility] [phone] FX     Additional details provided by patient: PATIENT REQUESTING 90 DAY SUPPLY OF THESE MEDICATIONS   Does the patient have less than a 3 day supply:  [x] Yes  [] No    Alistair Thacker Rep   11/02/22 10:40 EDT

## 2022-11-03 DIAGNOSIS — E03.9 ACQUIRED HYPOTHYROIDISM: Chronic | ICD-10-CM

## 2022-11-04 RX ORDER — LEVOTHYROXINE SODIUM 0.1 MG/1
100 TABLET ORAL DAILY
Qty: 30 TABLET | Refills: 0 | Status: SHIPPED | OUTPATIENT
Start: 2022-11-04 | End: 2022-11-05 | Stop reason: SDUPTHER

## 2022-11-04 NOTE — TELEPHONE ENCOUNTER
Sent a 30 day RX sent.  Pt has an upcoming appointment.  Thanks      Okay for the HUB to relay message

## 2022-11-05 NOTE — PROGRESS NOTES
The ABCs of the Annual Wellness Visit  Subsequent Medicare Wellness Visit    Chief Complaint   Patient presents with   • Diabetes   • Hypertension   • Hyperlipidemia   • muscle pain bilateral arms / legs      No injury x  3 weeks    • medicare wellness     Due       Subjective    History of Present Illness:  Shell Sanford is a 72 y.o. female who presents for a Subsequent Medicare Wellness Visit.    The following portions of the patient's history were reviewed and   updated as appropriate: allergies, current medications, past family history, past medical history, past social history, past surgical history and problem list.    Compared to one year ago, the patient feels her physical   health is the same.    Compared to one year ago, the patient feels her mental   health is the same.    Recent Hospitalizations:  She was not admitted to the hospital during the last year.       Current Medical Providers:  Patient Care Team:  Bryce Soto MD as PCP - General  Bryce Soto MD as PCP - Family Medicine  Oliva Ram MD as Surgeon (Breast Surgery)    Outpatient Medications Prior to Visit   Medication Sig Dispense Refill   • ALPRAZolam (XANAX) 0.5 MG tablet Take 1 tablet by mouth As Needed for Anxiety. 10 tablet 0   • ARIPiprazole (ABILIFY) 2 MG tablet Take 2 mg by mouth Daily.     • lamoTRIgine (LaMICtal) 200 MG tablet Take 200 mg by mouth Daily.     • omeprazole OTC (PriLOSEC OTC) 20 MG EC tablet Prilosec OTC 20 mg oral tablet,delayed release (DR/EC) take 1 tablet by oral route daily   Active     • venlafaxine XR (EFFEXOR-XR) 75 MG 24 hr capsule Take 75 mg by mouth 3 (Three) Times a Day.     • atorvastatin (LIPITOR) 40 MG tablet TAKE 1 TABLET BY MOUTH DAILY 30 tablet 0   • fenofibrate micronized (ANTARA) 130 MG capsule TAKE 1 capsule BY MOUTH EVERY MORNING BEFORE BREAKFAST 30 capsule 0   • hydroCHLOROthiazide (HYDRODIURIL) 25 MG tablet Take 1 tablet by mouth Daily. 90 tablet 1   • levothyroxine (SYNTHROID,  "LEVOTHROID) 100 MCG tablet TAKE 1 TABLET BY MOUTH DAILY 30 tablet 0   • losartan (COZAAR) 50 MG tablet TAKE 1 TABLET BY MOUTH DAILY 30 tablet 0     No facility-administered medications prior to visit.       No opioid medication identified on active medication list. I have reviewed chart for other potential  high risk medication/s and harmful drug interactions in the elderly.        Pt reports approximately 4-6 week h/o arm/leg \"sorenss\" and pain w/o change to strength. Started using some B12 and reports some improvement. Pt also taking Vit D 2,000 IU QD. Pain increases with activity. Pt reports some days better than others and seems to be worse with increased activity.            Aspirin is not on active medication list.  Aspirin use is not indicated based on review of current medical condition/s. Risk of harm outweighs potential benefits.  .    Patient Active Problem List   Diagnosis   • Hypertension   • Hyperlipemia   • Acquired hypothyroidism   • Bipolar disorder (HCC)   • Hypertensive chronic kidney disease with stage 1 through stage 4 chronic kidney disease, or unspecified chronic kidney disease   • Type 2 diabetes mellitus without complication (HCC)   • Family history of malignant neoplasm of colon   • Corns and callus   • Breast cancer (HCC)   • Bunion   • Stage 3b chronic kidney disease (HCC)     Advance Care Planning  Advance Directive is not on file.  ACP discussion was held with the patient during this visit. Patient does not have an advance directive, declines further assistance.    Review of Systems   Constitutional: Positive for fatigue. Negative for chills and fever.   HENT: Negative for congestion and sinus pressure.    Eyes: Negative for visual disturbance.   Respiratory: Negative for cough and shortness of breath.    Cardiovascular: Negative for chest pain.   Gastrointestinal: Negative for abdominal pain.   Genitourinary: Negative for dysuria.   Musculoskeletal: Positive for myalgias.   Skin: " "Negative for rash.   Hematological: Negative for adenopathy.   Psychiatric/Behavioral: Negative for dysphoric mood.        Objective    Vitals:    11/07/22 1035   BP: 112/74   Pulse: 96   Resp: 18   Temp: 97.3 °F (36.3 °C)   TempSrc: Oral   Weight: 71.2 kg (157 lb)   Height: 160 cm (62.99\")   PainSc:   6     Estimated body mass index is 27.82 kg/m² as calculated from the following:    Height as of this encounter: 160 cm (62.99\").    Weight as of this encounter: 71.2 kg (157 lb).    BMI is >= 25 and <30. (Overweight) The following options were offered after discussion;: exercise counseling/recommendations and nutrition counseling/recommendations      Does the patient have evidence of cognitive impairment? No    Physical Exam  Constitutional:       General: She is not in acute distress.     Appearance: She is well-developed.   Pulmonary:      Effort: Pulmonary effort is normal.   Musculoskeletal:         General: Tenderness (multiple trigger points noted) present.   Neurological:      Mental Status: She is alert and oriented to person, place, and time.   Psychiatric:         Behavior: Behavior normal.         Thought Content: Thought content normal.                 HEALTH RISK ASSESSMENT    Smoking Status:  Social History     Tobacco Use   Smoking Status Every Day   • Packs/day: 0.50   • Types: Cigarettes   • Start date: 1980   Smokeless Tobacco Never     Alcohol Consumption:  Social History     Substance and Sexual Activity   Alcohol Use No     Fall Risk Screen:    STEADI Fall Risk Assessment was completed, and patient is at LOW risk for falls.Assessment completed on:5/4/2022    Depression Screening:  PHQ-2/PHQ-9 Depression Screening 11/7/2022   Retired PHQ-9 Total Score -   Retired Total Score -   Little Interest or Pleasure in Doing Things 0-->not at all   Feeling Down, Depressed or Hopeless 0-->not at all   PHQ-9: Brief Depression Severity Measure Score 0       Health Habits and Functional and Cognitive " Screening:  Functional & Cognitive Status 11/7/2022   Do you have difficulty preparing food and eating? No   Do you have difficulty bathing yourself, getting dressed or grooming yourself? No   Do you have difficulty using the toilet? No   Do you have difficulty moving around from place to place? No   Do you have trouble with steps or getting out of a bed or a chair? No   Current Diet Well Balanced Diet   Dental Exam Up to date   Eye Exam Up to date   Exercise (times per week) 0 times per week   Current Exercises Include No Regular Exercise   Current Exercise Activities Include -   Do you need help using the phone?  No   Are you deaf or do you have serious difficulty hearing?  No   Do you need help with transportation? No   Do you need help shopping? No   Do you need help preparing meals?  No   Do you need help with housework?  No   Do you need help with laundry? No   Do you need help taking your medications? No   Do you need help managing money? No   Do you ever drive or ride in a car without wearing a seat belt? No   Have you felt unusual stress, anger or loneliness in the last month? No   Who do you live with? Spouse   If you need help, do you have trouble finding someone available to you? Yes   Have you been bothered in the last four weeks by sexual problems? No   Do you have difficulty concentrating, remembering or making decisions? No       Age-appropriate Screening Schedule:  Refer to the list below for future screening recommendations based on patient's age, sex and/or medical conditions. Orders for these recommended tests are listed in the plan section. The patient has been provided with a written plan.    Health Maintenance   Topic Date Due   • DXA SCAN  07/10/2016   • DIABETIC FOOT EXAM  04/28/2022   • LIPID PANEL  10/29/2022   • URINE MICROALBUMIN  10/29/2022   • HEMOGLOBIN A1C  11/04/2022   • TDAP/TD VACCINES (2 - Td or Tdap) 01/01/2023   • DIABETIC EYE EXAM  08/02/2023   • INFLUENZA VACCINE  Completed    • PAP SMEAR  Discontinued   • ZOSTER VACCINE  Discontinued              Assessment & Plan   CMS Preventative Services Quick Reference  Risk Factors Identified During Encounter  Cardiovascular Disease  The above risks/problems have been discussed with the patient.  Follow up actions/plans if indicated are seen below in the Assessment/Plan Section.  Pertinent information has been shared with the patient in the After Visit Summary.    Diagnoses and all orders for this visit:    1. Medicare annual wellness visit, subsequent (Primary)    2. Type 2 diabetes mellitus without complication, without long-term current use of insulin ([facility])  -     Comprehensive metabolic panel  -     Lipid panel  -     Hemoglobin A1c  -     MicroAlbumin, Urine, Random - Urine, Clean Catch    3. Essential hypertension  -     losartan (COZAAR) 50 MG tablet; Take 1 tablet by mouth Daily.  Dispense: 90 tablet; Refill: 1  -     hydroCHLOROthiazide (HYDRODIURIL) 25 MG tablet; Take 1 tablet by mouth Daily.  Dispense: 90 tablet; Refill: 1  -     Comprehensive metabolic panel  -     Lipid panel  -     CBC and Differential    4. Acquired hypothyroidism  -     levothyroxine (SYNTHROID, LEVOTHROID) 100 MCG tablet; Take 1 tablet by mouth Daily.  Dispense: 90 tablet; Refill: 1  -     TSH  -     T4, Free    5. Mixed hyperlipidemia  -     fenofibrate micronized (ANTARA) 130 MG capsule; Take 1 capsule by mouth Every Morning Before Breakfast.  Dispense: 90 capsule; Refill: 1  -     atorvastatin (LIPITOR) 40 MG tablet; Take 1 tablet by mouth Daily.  Dispense: 90 tablet; Refill: 1  -     Lipid panel    6. Myalgia  Comments:  new, workup started  Orders:  -     CK  -     Sedimentation Rate    7. Vitamin D deficiency  -     Vitamin D,25-Hydroxy    8. Other fatigue  -     Vitamin B12    Discussed possibility of Fibromyalgia and pt to do some research in this regard. Pt to hold her Lipitor x 14 days and see if improves.     Follow Up:   Return in about 6 months  (around 5/7/2023) for Recheck.     An After Visit Summary and PPPS were made available to the patient.          I spent 15 minutes caring for Shell on this date of service. This time includes time spent by me in the following activities:preparing for the visit

## 2022-11-07 ENCOUNTER — OFFICE VISIT (OUTPATIENT)
Dept: FAMILY MEDICINE CLINIC | Facility: CLINIC | Age: 72
End: 2022-11-07

## 2022-11-07 VITALS
BODY MASS INDEX: 27.82 KG/M2 | SYSTOLIC BLOOD PRESSURE: 112 MMHG | WEIGHT: 157 LBS | HEART RATE: 96 BPM | DIASTOLIC BLOOD PRESSURE: 74 MMHG | TEMPERATURE: 97.3 F | RESPIRATION RATE: 18 BRPM | HEIGHT: 63 IN

## 2022-11-07 DIAGNOSIS — I10 ESSENTIAL HYPERTENSION: Chronic | ICD-10-CM

## 2022-11-07 DIAGNOSIS — E55.9 VITAMIN D DEFICIENCY: ICD-10-CM

## 2022-11-07 DIAGNOSIS — Z00.00 MEDICARE ANNUAL WELLNESS VISIT, SUBSEQUENT: Primary | ICD-10-CM

## 2022-11-07 DIAGNOSIS — E78.2 MIXED HYPERLIPIDEMIA: Chronic | ICD-10-CM

## 2022-11-07 DIAGNOSIS — E11.9 TYPE 2 DIABETES MELLITUS WITHOUT COMPLICATION, WITHOUT LONG-TERM CURRENT USE OF INSULIN: Chronic | ICD-10-CM

## 2022-11-07 DIAGNOSIS — E03.9 ACQUIRED HYPOTHYROIDISM: Chronic | ICD-10-CM

## 2022-11-07 DIAGNOSIS — M79.10 MYALGIA: ICD-10-CM

## 2022-11-07 DIAGNOSIS — R53.83 OTHER FATIGUE: ICD-10-CM

## 2022-11-07 PROCEDURE — 1159F MED LIST DOCD IN RCRD: CPT | Performed by: FAMILY MEDICINE

## 2022-11-07 PROCEDURE — G0439 PPPS, SUBSEQ VISIT: HCPCS | Performed by: FAMILY MEDICINE

## 2022-11-07 PROCEDURE — 1125F AMNT PAIN NOTED PAIN PRSNT: CPT | Performed by: FAMILY MEDICINE

## 2022-11-07 PROCEDURE — 1170F FXNL STATUS ASSESSED: CPT | Performed by: FAMILY MEDICINE

## 2022-11-07 PROCEDURE — 99213 OFFICE O/P EST LOW 20 MIN: CPT | Performed by: FAMILY MEDICINE

## 2022-11-07 RX ORDER — LOSARTAN POTASSIUM 50 MG/1
50 TABLET ORAL DAILY
Qty: 90 TABLET | Refills: 1 | Status: SHIPPED | OUTPATIENT
Start: 2022-11-07

## 2022-11-07 RX ORDER — HYDROCHLOROTHIAZIDE 25 MG/1
25 TABLET ORAL DAILY
Qty: 90 TABLET | Refills: 1 | Status: SHIPPED | OUTPATIENT
Start: 2022-11-07

## 2022-11-07 RX ORDER — ATORVASTATIN CALCIUM 40 MG/1
40 TABLET, FILM COATED ORAL DAILY
Qty: 90 TABLET | Refills: 1 | Status: SHIPPED | OUTPATIENT
Start: 2022-11-07 | End: 2023-03-20 | Stop reason: SINTOL

## 2022-11-07 RX ORDER — LEVOTHYROXINE SODIUM 0.1 MG/1
100 TABLET ORAL DAILY
Qty: 90 TABLET | Refills: 1 | Status: SHIPPED | OUTPATIENT
Start: 2022-11-07 | End: 2022-11-08 | Stop reason: SDUPTHER

## 2022-11-07 RX ORDER — FENOFIBRATE 130 MG/1
130 CAPSULE ORAL
Qty: 90 CAPSULE | Refills: 1 | Status: SHIPPED | OUTPATIENT
Start: 2022-11-07

## 2022-11-07 NOTE — PATIENT INSTRUCTIONS
Medicare Wellness  Personal Prevention Plan of Service     Date of Office Visit:    Encounter Provider:  Bryce Soto MD  Place of Service:  [facility]  Patient Name: Shell Sanford  :  1950    As part of the Medicare Wellness portion of your visit today, we are providing you with this personalized preventive plan of services (PPPS). This plan is based upon recommendations of the [place] Preventive Services Task Force (USPSTF) and the Advisory Committee on Immunization Practices (ACIP).    This lists the preventive care services that should be considered, and provides dates of when you are due. Items listed as completed are up-to-date and do not require any further intervention.    Health Maintenance   Topic Date Due    DXA SCAN  07/10/2016    DIABETIC FOOT EXAM  2022    LIPID PANEL  10/29/2022    URINE MICROALBUMIN  10/29/2022    HEMOGLOBIN A1C  2022    TDAP/TD VACCINES (2 - Td or Tdap) 2023    DIABETIC EYE EXAM  2023    ANNUAL WELLNESS VISIT  2023    COLORECTAL CANCER SCREENING  2026    COVID-19 Vaccine  Completed    INFLUENZA VACCINE  Completed    Pneumococcal Vaccine 65+  Completed    HEPATITIS C SCREENING  Discontinued    PAP SMEAR  Discontinued    ZOSTER VACCINE  Discontinued       Orders Placed This Encounter   Procedures    Comprehensive metabolic panel     Order Specific Question:   Release to patient     Answer:   Routine Release    Lipid panel    TSH     Order Specific Question:   Release to patient     Answer:   Routine Release    Hemoglobin A1c     Order Specific Question:   Release to patient     Answer:   Routine Release    MicroAlbumin, Urine, Random - Urine, Clean Catch     Order Specific Question:   Release to patient     Answer:   Routine Release    T4, Free     Order Specific Question:   Release to patient     Answer:   Routine Release    Vitamin B12     Order Specific Question:   Release to patient     Answer:    Routine Release    Vitamin D,25-Hydroxy     Order Specific Question:   Release to patient     Answer:   Routine Release    CK     Order Specific Question:   Release to patient     Answer:   Routine Release    Sedimentation Rate     Order Specific Question:   Release to patient     Answer:   Immediate    CBC and Differential     Order Specific Question:   Manual Differential     Answer:   Yes       Return in about 6 months (around 5/7/2023) for Recheck.

## 2022-11-08 DIAGNOSIS — E03.9 ACQUIRED HYPOTHYROIDISM: Chronic | ICD-10-CM

## 2022-11-08 LAB
25(OH)D3+25(OH)D2 SERPL-MCNC: 50.2 NG/ML (ref 30–100)
ALBUMIN SERPL-MCNC: 4.8 G/DL (ref 3.5–5.2)
ALBUMIN/GLOB SERPL: 1.7 G/DL
ALP SERPL-CCNC: 92 U/L (ref 39–117)
ALT SERPL-CCNC: 23 U/L (ref 1–33)
AST SERPL-CCNC: 29 U/L (ref 1–32)
BASOPHILS # BLD AUTO: 0.06 10*3/MM3 (ref 0–0.2)
BASOPHILS NFR BLD AUTO: 0.5 % (ref 0–1.5)
BILIRUB SERPL-MCNC: 0.4 MG/DL (ref 0–1.2)
BUN SERPL-MCNC: 21 MG/DL (ref 8–23)
BUN/CREAT SERPL: 15.1 (ref 7–25)
CALCIUM SERPL-MCNC: 10.1 MG/DL (ref 8.6–10.5)
CHLORIDE SERPL-SCNC: 101 MMOL/L (ref 98–107)
CHOLEST SERPL-MCNC: 207 MG/DL (ref 0–200)
CK SERPL-CCNC: 77 U/L (ref 20–180)
CO2 SERPL-SCNC: 27 MMOL/L (ref 22–29)
CREAT SERPL-MCNC: 1.39 MG/DL (ref 0.57–1)
EGFRCR SERPLBLD CKD-EPI 2021: 40.2 ML/MIN/1.73
EOSINOPHIL # BLD AUTO: 0.3 10*3/MM3 (ref 0–0.4)
EOSINOPHIL NFR BLD AUTO: 2.7 % (ref 0.3–6.2)
ERYTHROCYTE [DISTWIDTH] IN BLOOD BY AUTOMATED COUNT: 13.3 % (ref 12.3–15.4)
ERYTHROCYTE [SEDIMENTATION RATE] IN BLOOD BY WESTERGREN METHOD: 25 MM/HR (ref 0–30)
GLOBULIN SER CALC-MCNC: 2.8 GM/DL
GLUCOSE SERPL-MCNC: 94 MG/DL (ref 65–99)
HBA1C MFR BLD: 7 % (ref 4.8–5.6)
HCT VFR BLD AUTO: 39.7 % (ref 34–46.6)
HDLC SERPL-MCNC: 55 MG/DL (ref 40–60)
HGB BLD-MCNC: 13.3 G/DL (ref 12–15.9)
IMM GRANULOCYTES # BLD AUTO: 0.03 10*3/MM3 (ref 0–0.05)
IMM GRANULOCYTES NFR BLD AUTO: 0.3 % (ref 0–0.5)
LDLC SERPL CALC-MCNC: 124 MG/DL (ref 0–100)
LYMPHOCYTES # BLD AUTO: 4.01 10*3/MM3 (ref 0.7–3.1)
LYMPHOCYTES NFR BLD AUTO: 36.5 % (ref 19.6–45.3)
MCH RBC QN AUTO: 28.7 PG (ref 26.6–33)
MCHC RBC AUTO-ENTMCNC: 33.5 G/DL (ref 31.5–35.7)
MCV RBC AUTO: 85.6 FL (ref 79–97)
MICROALBUMIN UR-MCNC: 95.2 UG/ML
MONOCYTES # BLD AUTO: 0.83 10*3/MM3 (ref 0.1–0.9)
MONOCYTES NFR BLD AUTO: 7.6 % (ref 5–12)
NEUTROPHILS # BLD AUTO: 5.75 10*3/MM3 (ref 1.7–7)
NEUTROPHILS NFR BLD AUTO: 52.4 % (ref 42.7–76)
NRBC BLD AUTO-RTO: 0.2 /100 WBC (ref 0–0.2)
PLATELET # BLD AUTO: 419 10*3/MM3 (ref 140–450)
POTASSIUM SERPL-SCNC: 4.3 MMOL/L (ref 3.5–5.2)
PROT SERPL-MCNC: 7.6 G/DL (ref 6–8.5)
RBC # BLD AUTO: 4.64 10*6/MM3 (ref 3.77–5.28)
SODIUM SERPL-SCNC: 140 MMOL/L (ref 136–145)
T4 FREE SERPL-MCNC: 1.89 NG/DL (ref 0.93–1.7)
TRIGL SERPL-MCNC: 159 MG/DL (ref 0–150)
TSH SERPL DL<=0.005 MIU/L-ACNC: 0.1 UIU/ML (ref 0.27–4.2)
VIT B12 SERPL-MCNC: >2000 PG/ML (ref 211–946)
VLDLC SERPL CALC-MCNC: 28 MG/DL (ref 5–40)
WBC # BLD AUTO: 10.98 10*3/MM3 (ref 3.4–10.8)

## 2022-11-08 RX ORDER — LEVOTHYROXINE SODIUM 88 UG/1
88 TABLET ORAL DAILY
Qty: 90 TABLET | Refills: 1 | Status: SHIPPED | OUTPATIENT
Start: 2022-11-08

## 2023-03-20 DIAGNOSIS — E78.2 MIXED HYPERLIPIDEMIA: Primary | ICD-10-CM

## 2023-03-20 RX ORDER — ROSUVASTATIN CALCIUM 10 MG/1
10 TABLET, COATED ORAL DAILY
Qty: 90 TABLET | Refills: 0 | Status: SHIPPED | OUTPATIENT
Start: 2023-03-20

## 2023-05-02 DIAGNOSIS — I10 ESSENTIAL HYPERTENSION: Chronic | ICD-10-CM

## 2023-05-02 DIAGNOSIS — E78.2 MIXED HYPERLIPIDEMIA: Chronic | ICD-10-CM

## 2023-05-02 RX ORDER — FENOFIBRATE 130 MG/1
130 CAPSULE ORAL
Qty: 30 CAPSULE | Refills: 0 | Status: SHIPPED | OUTPATIENT
Start: 2023-05-02

## 2023-05-02 RX ORDER — LOSARTAN POTASSIUM 50 MG/1
50 TABLET ORAL DAILY
Qty: 30 TABLET | Refills: 0 | Status: SHIPPED | OUTPATIENT
Start: 2023-05-02

## 2023-05-27 NOTE — TELEPHONE ENCOUNTER
Lab Orders needed. Pt coming in for labs on 03/29/21.  
Order placed. I refilled Aunt Shell's Synthroid, too.  
PT IS REQUESTING A CALL BACK TO SEE WHY HER LEVOTHYROXINE WAS DENIED   
PT TOLD GEOVANNY  
Patient has a scheduled appointment  for med refill with dr segura 3/30/2021   
BIBA for left leg pain

## 2024-04-30 ENCOUNTER — OFFICE VISIT (OUTPATIENT)
Dept: GASTROENTEROLOGY | Facility: CLINIC | Age: 74
End: 2024-04-30
Payer: MEDICARE

## 2024-04-30 VITALS
TEMPERATURE: 97.1 F | WEIGHT: 156 LBS | HEIGHT: 63 IN | SYSTOLIC BLOOD PRESSURE: 92 MMHG | HEART RATE: 90 BPM | BODY MASS INDEX: 27.64 KG/M2 | DIASTOLIC BLOOD PRESSURE: 67 MMHG

## 2024-04-30 DIAGNOSIS — K59.03 DRUG-INDUCED CONSTIPATION: Primary | ICD-10-CM

## 2024-04-30 PROCEDURE — 1159F MED LIST DOCD IN RCRD: CPT | Performed by: INTERNAL MEDICINE

## 2024-04-30 PROCEDURE — 3078F DIAST BP <80 MM HG: CPT | Performed by: INTERNAL MEDICINE

## 2024-04-30 PROCEDURE — 3074F SYST BP LT 130 MM HG: CPT | Performed by: INTERNAL MEDICINE

## 2024-04-30 PROCEDURE — 1160F RVW MEDS BY RX/DR IN RCRD: CPT | Performed by: INTERNAL MEDICINE

## 2024-04-30 PROCEDURE — 99203 OFFICE O/P NEW LOW 30 MIN: CPT | Performed by: INTERNAL MEDICINE

## 2024-04-30 RX ORDER — PLECANATIDE 3 MG/1
TABLET ORAL
COMMUNITY
Start: 2024-04-23

## 2024-04-30 RX ORDER — LOTEPREDNOL ETABONATE 5 MG/ML
SUSPENSION/ DROPS OPHTHALMIC
COMMUNITY
Start: 2024-03-22

## 2024-04-30 RX ORDER — TRAZODONE HYDROCHLORIDE 100 MG/1
100 TABLET ORAL EVERY EVENING
COMMUNITY
Start: 2024-01-10

## 2024-04-30 RX ORDER — EZETIMIBE 10 MG/1
10 TABLET ORAL EVERY EVENING
COMMUNITY
Start: 2024-04-02

## 2024-04-30 NOTE — PROGRESS NOTES
Chief Complaint   Patient presents with   • Constipation     Shell Sanford is a 73 y.o. female who presents with a history of months constipation  HPI    Patient 73-year-old female with history of hypertension, hyperlipidemia, bipolar disease, obstructive sleep apnea with family history of colon cancer presenting with constipation for several months.  Patient reports initial follow-up with provider patient was given Trulance and when it was not improving her symptoms patient was referred here.  Since that time however patient reports Trulance is beginning to work and her bowels are back to normal.  Patient was recently started on trazodone at the time her symptoms began.  Patient here for further recommendations.    Past Medical History:   Diagnosis Date   • Anxiety    • Bipolar affect, depressed    • Cancer     Breast   • Chronic constipation    • Colon polyp     Last colonoscopy - Dr Leiva   • GERD (gastroesophageal reflux disease)    • H/O complete eye exam 01/01/2017   • History of breast cancer 2011   • History of gastric ulcer    • Hyperlipidemia    • Hypertension    • Hypothyroidism     HYPOTHYROIDISM   • Irritable bowel syndrome    • Prediabetes     NO MEDS   • Sleep apnea     NO MACHINE, WEARS MOUTH NIGHTGUARD   • Upper abdominal pain        Current Outpatient Medications:   •  ALPRAZolam (XANAX) 0.5 MG tablet, Take 1 tablet by mouth As Needed for Anxiety., Disp: 10 tablet, Rfl: 0  •  ARIPiprazole (ABILIFY) 2 MG tablet, Take 1 tablet by mouth Daily., Disp: , Rfl:   •  ezetimibe (ZETIA) 10 MG tablet, Take 1 tablet by mouth Every Evening., Disp: , Rfl:   •  fenofibrate micronized (ANTARA) 130 MG capsule, TAKE 1 capsule BY MOUTH EVERY MORNING BEFORE BREAKFAST, Disp: 15 capsule, Rfl: 0  •  hydroCHLOROthiazide (HYDRODIURIL) 25 MG tablet, Take 1 tablet by mouth Daily., Disp: 90 tablet, Rfl: 1  •  lamoTRIgine (LaMICtal) 200 MG tablet, Take 1 tablet by mouth Daily., Disp: , Rfl:   •  levothyroxine (SYNTHROID,  LEVOTHROID) 88 MCG tablet, Take 1 tablet by mouth Daily., Disp: 90 tablet, Rfl: 1  •  losartan (COZAAR) 50 MG tablet, TAKE 1 TABLET BY MOUTH DAILY, Disp: 15 tablet, Rfl: 0  •  loteprednol (LOTEMAX) 0.5 % ophthalmic suspension, INSTILL 1 DROP IN EACH EYE TWICE DAILY FOR ALLERGIES, Disp: , Rfl:   •  omeprazole OTC (PriLOSEC OTC) 20 MG EC tablet, Prilosec OTC 20 mg oral tablet,delayed release (DR/EC) take 1 tablet by oral route daily   Active, Disp: , Rfl:   •  traZODone (DESYREL) 100 MG tablet, Take 1 tablet by mouth Every Evening., Disp: , Rfl:   •  Trulance 3 MG tablet, , Disp: , Rfl:   •  venlafaxine XR (EFFEXOR-XR) 75 MG 24 hr capsule, Take 1 capsule by mouth 3 (Three) Times a Day., Disp: , Rfl:   Allergies   Allergen Reactions   • Cephalexin Nausea And Vomiting, GI Intolerance and Other (See Comments)     Other reaction(s): GI Intolerance, Other (see comments)   • Neomycin-Bacitracin Zn-Polymyx Other (See Comments)   • Penicillins Other (See Comments)     MOTHER & DAUGHTER HAD SEVERE REACTION.  DAUGHTER HAD EXTREME SWELLING AND TROUBLE BREATHING   • Pramoxine-Benzalkonium Cl Other (See Comments)     Other reaction(s): Other (see comments)   • Sulfamethoxazole-Trimethoprim Other (See Comments)     Other reaction(s): Other (see comments)   • Neosporin [Neomycin-Polymyxin-Gramicidin] Rash     Social History     Socioeconomic History   • Marital status:    Tobacco Use   • Smoking status: Every Day     Current packs/day: 0.50     Average packs/day: 0.5 packs/day for 49.9 years (25.0 ttl pk-yrs)     Types: Cigarettes     Start date: 1/1/1980   • Smokeless tobacco: Never   Vaping Use   • Vaping status: Never Used   Substance and Sexual Activity   • Alcohol use: Never   • Drug use: Never   • Sexual activity: Not Currently     Partners: Male     Family History   Problem Relation Age of Onset   • Colon cancer Father 60   • Alcohol abuse Father    • Breast cancer Sister 39   • Malig Hyperthermia Neg Hx      Review of  Systems   Constitutional: Negative.    HENT: Negative.     Eyes: Negative.    Respiratory: Negative.     Cardiovascular: Negative.    Gastrointestinal:  Positive for constipation. Negative for abdominal distention, abdominal pain, anal bleeding, blood in stool, diarrhea, nausea, rectal pain and vomiting.   Endocrine: Negative.    Musculoskeletal: Negative.    Skin: Negative.    Allergic/Immunologic: Negative.    Hematological: Negative.    Vitals:    04/30/24 0830   BP: 92/67   Pulse: 90   Temp: 97.1 °F (36.2 °C)     Physical Exam  Vitals and nursing note reviewed.   Constitutional:       Appearance: Normal appearance. She is well-developed.   HENT:      Head: Normocephalic and atraumatic.   Eyes:      General: No scleral icterus.     Pupils: Pupils are equal, round, and reactive to light.   Cardiovascular:      Rate and Rhythm: Normal rate and regular rhythm.      Heart sounds: Normal heart sounds.   Pulmonary:      Effort: Pulmonary effort is normal. No respiratory distress.      Breath sounds: Normal breath sounds.   Abdominal:      General: Bowel sounds are normal. There is no distension or abdominal bruit.      Palpations: Abdomen is soft. Abdomen is not rigid. There is no shifting dullness, fluid wave, mass or pulsatile mass.      Tenderness: There is no abdominal tenderness. There is no guarding.      Hernia: No hernia is present.   Musculoskeletal:         General: No swelling or tenderness. Normal range of motion.      Cervical back: Normal range of motion and neck supple. No rigidity.   Skin:     General: Skin is warm and dry.      Coloration: Skin is not jaundiced.      Findings: No rash.   Neurological:      General: No focal deficit present.      Mental Status: She is alert and oriented to person, place, and time.      Cranial Nerves: No cranial nerve deficit.   Psychiatric:         Behavior: Behavior normal.         Thought Content: Thought content normal.   Diagnoses and all orders for this  visit:    Drug-induced constipation    Other orders  -     ezetimibe (ZETIA) 10 MG tablet; Take 1 tablet by mouth Every Evening.  -     loteprednol (LOTEMAX) 0.5 % ophthalmic suspension; INSTILL 1 DROP IN EACH EYE TWICE DAILY FOR ALLERGIES  -     Trulance 3 MG tablet  -     traZODone (DESYREL) 100 MG tablet; Take 1 tablet by mouth Every Evening.    Patient 73-year-old female with history of hypertension, hyperlipidemia, reflux, obstructive sleep apnea presenting with complaints of constipation.  Patient reports several months of constipation that initially was not responsive to Trulance and patient sent for further recommendations on review patient recently started on trazodone at bedtime apparently taking as needed when her symptoms began.  Patient patient followed up with primary after starting Trulance and not getting response and was referred here.  Patient is seen March 21 for colonoscopy screening which was otherwise unremarkable.  Patient reports since that time the Trulance has started working and is feeling markedly improved taking daily.  At this point symptoms consistent with medication induced constipation.  Would recommend as long as patient is not having overreaction to Trulance and eating the trazodone as needed she should continue to take the Trulance daily and the trazodone as needed.  If patient gets over responsive to the Trulance patient should call for medication adjustment.  Will be available as needed.

## 2024-10-12 ENCOUNTER — HOSPITAL ENCOUNTER (EMERGENCY)
Facility: HOSPITAL | Age: 74
Discharge: HOME | End: 2024-10-12
Attending: EMERGENCY MEDICINE
Payer: MEDICARE

## 2024-10-12 ENCOUNTER — APPOINTMENT (OUTPATIENT)
Dept: GENERAL RADIOLOGY | Facility: HOSPITAL | Age: 74
End: 2024-10-12
Payer: MEDICARE

## 2024-10-12 VITALS
RESPIRATION RATE: 20 BRPM | OXYGEN SATURATION: 90 % | DIASTOLIC BLOOD PRESSURE: 74 MMHG | TEMPERATURE: 98.1 F | HEART RATE: 88 BPM | BODY MASS INDEX: 26.52 KG/M2 | SYSTOLIC BLOOD PRESSURE: 101 MMHG | HEIGHT: 63 IN | WEIGHT: 149.69 LBS

## 2024-10-12 DIAGNOSIS — J40 BRONCHITIS: ICD-10-CM

## 2024-10-12 DIAGNOSIS — R53.1 WEAKNESS: ICD-10-CM

## 2024-10-12 DIAGNOSIS — R11.0 NAUSEA: ICD-10-CM

## 2024-10-12 DIAGNOSIS — U07.1 COVID-19: Primary | ICD-10-CM

## 2024-10-12 LAB
ALBUMIN SERPL-MCNC: 3.9 G/DL (ref 3.5–5.2)
ALBUMIN/GLOB SERPL: 0.9 G/DL
ALP SERPL-CCNC: 166 U/L (ref 39–117)
ALT SERPL W P-5'-P-CCNC: 25 U/L (ref 1–33)
ANION GAP SERPL CALCULATED.3IONS-SCNC: 12.5 MMOL/L (ref 5–15)
AST SERPL-CCNC: 26 U/L (ref 1–32)
BACTERIA UR QL AUTO: ABNORMAL /HPF
BASOPHILS # BLD AUTO: 0.1 10*3/MM3 (ref 0–0.2)
BASOPHILS NFR BLD AUTO: 0.8 % (ref 0–1.5)
BILIRUB SERPL-MCNC: 0.4 MG/DL (ref 0–1.2)
BILIRUB UR QL STRIP: NEGATIVE
BUN SERPL-MCNC: 19 MG/DL (ref 8–23)
BUN/CREAT SERPL: 18.4 (ref 7–25)
CALCIUM SPEC-SCNC: 9.5 MG/DL (ref 8.6–10.5)
CHLORIDE SERPL-SCNC: 93 MMOL/L (ref 98–107)
CLARITY UR: CLEAR
CO2 SERPL-SCNC: 26.5 MMOL/L (ref 22–29)
COLOR UR: ABNORMAL
CREAT SERPL-MCNC: 1.03 MG/DL (ref 0.57–1)
DEPRECATED RDW RBC AUTO: 42.2 FL (ref 37–54)
EGFRCR SERPLBLD CKD-EPI 2021: 56.8 ML/MIN/1.73
EOSINOPHIL # BLD AUTO: 0.17 10*3/MM3 (ref 0–0.4)
EOSINOPHIL NFR BLD AUTO: 1.3 % (ref 0.3–6.2)
ERYTHROCYTE [DISTWIDTH] IN BLOOD BY AUTOMATED COUNT: 14 % (ref 12.3–15.4)
FLUAV SUBTYP SPEC NAA+PROBE: NOT DETECTED
FLUBV RNA ISLT QL NAA+PROBE: NOT DETECTED
GLOBULIN UR ELPH-MCNC: 4.2 GM/DL
GLUCOSE SERPL-MCNC: 127 MG/DL (ref 65–99)
GLUCOSE UR STRIP-MCNC: NEGATIVE MG/DL
HCT VFR BLD AUTO: 38.7 % (ref 34–46.6)
HGB BLD-MCNC: 12.6 G/DL (ref 12–15.9)
HGB UR QL STRIP.AUTO: NEGATIVE
HOLD SPECIMEN: NORMAL
HOLD SPECIMEN: NORMAL
HYALINE CASTS UR QL AUTO: ABNORMAL /LPF
IMM GRANULOCYTES # BLD AUTO: 0.06 10*3/MM3 (ref 0–0.05)
IMM GRANULOCYTES NFR BLD AUTO: 0.5 % (ref 0–0.5)
KETONES UR QL STRIP: NEGATIVE
LEUKOCYTE ESTERASE UR QL STRIP.AUTO: ABNORMAL
LYMPHOCYTES # BLD AUTO: 3.28 10*3/MM3 (ref 0.7–3.1)
LYMPHOCYTES NFR BLD AUTO: 25.3 % (ref 19.6–45.3)
MAGNESIUM SERPL-MCNC: 2 MG/DL (ref 1.6–2.4)
MCH RBC QN AUTO: 27.5 PG (ref 26.6–33)
MCHC RBC AUTO-ENTMCNC: 32.6 G/DL (ref 31.5–35.7)
MCV RBC AUTO: 84.5 FL (ref 79–97)
MONOCYTES # BLD AUTO: 0.89 10*3/MM3 (ref 0.1–0.9)
MONOCYTES NFR BLD AUTO: 6.9 % (ref 5–12)
NEUTROPHILS NFR BLD AUTO: 65.2 % (ref 42.7–76)
NEUTROPHILS NFR BLD AUTO: 8.45 10*3/MM3 (ref 1.7–7)
NITRITE UR QL STRIP: NEGATIVE
NRBC BLD AUTO-RTO: 0 /100 WBC (ref 0–0.2)
PH UR STRIP.AUTO: 7 [PH] (ref 5–8)
PLATELET # BLD AUTO: 776 10*3/MM3 (ref 140–450)
PMV BLD AUTO: 8.7 FL (ref 6–12)
POTASSIUM SERPL-SCNC: 4.7 MMOL/L (ref 3.5–5.2)
PROT SERPL-MCNC: 8.1 G/DL (ref 6–8.5)
PROT UR QL STRIP: ABNORMAL
QT INTERVAL: 368 MS
QTC INTERVAL: 459 MS
RBC # BLD AUTO: 4.58 10*6/MM3 (ref 3.77–5.28)
RBC # UR STRIP: ABNORMAL /HPF
REF LAB TEST METHOD: ABNORMAL
RSV RNA NPH QL NAA+NON-PROBE: NOT DETECTED
SARS-COV-2 RNA RESP QL NAA+PROBE: DETECTED
SODIUM SERPL-SCNC: 132 MMOL/L (ref 136–145)
SP GR UR STRIP: 1.01 (ref 1–1.03)
SQUAMOUS #/AREA URNS HPF: ABNORMAL /HPF
T4 FREE SERPL-MCNC: 1.32 NG/DL (ref 0.92–1.68)
TROPONIN T SERPL HS-MCNC: 17 NG/L
TSH SERPL DL<=0.05 MIU/L-ACNC: 8.16 UIU/ML (ref 0.27–4.2)
UROBILINOGEN UR QL STRIP: ABNORMAL
WBC # UR STRIP: ABNORMAL /HPF
WBC NRBC COR # BLD AUTO: 12.95 10*3/MM3 (ref 3.4–10.8)
WHOLE BLOOD HOLD COAG: NORMAL
WHOLE BLOOD HOLD SPECIMEN: NORMAL

## 2024-10-12 PROCEDURE — 81001 URINALYSIS AUTO W/SCOPE: CPT

## 2024-10-12 PROCEDURE — 85025 COMPLETE CBC W/AUTO DIFF WBC: CPT

## 2024-10-12 PROCEDURE — 71045 X-RAY EXAM CHEST 1 VIEW: CPT

## 2024-10-12 PROCEDURE — 84443 ASSAY THYROID STIM HORMONE: CPT

## 2024-10-12 PROCEDURE — 84484 ASSAY OF TROPONIN QUANT: CPT

## 2024-10-12 PROCEDURE — 99284 EMERGENCY DEPT VISIT MOD MDM: CPT

## 2024-10-12 PROCEDURE — 93005 ELECTROCARDIOGRAM TRACING: CPT

## 2024-10-12 PROCEDURE — 83735 ASSAY OF MAGNESIUM: CPT

## 2024-10-12 PROCEDURE — 25010000002 ONDANSETRON PER 1 MG: Performed by: EMERGENCY MEDICINE

## 2024-10-12 PROCEDURE — 93005 ELECTROCARDIOGRAM TRACING: CPT | Performed by: EMERGENCY MEDICINE

## 2024-10-12 PROCEDURE — 25010000002 METHYLPREDNISOLONE PER 125 MG: Performed by: EMERGENCY MEDICINE

## 2024-10-12 PROCEDURE — 96374 THER/PROPH/DIAG INJ IV PUSH: CPT

## 2024-10-12 PROCEDURE — 84439 ASSAY OF FREE THYROXINE: CPT

## 2024-10-12 PROCEDURE — 96375 TX/PRO/DX INJ NEW DRUG ADDON: CPT

## 2024-10-12 PROCEDURE — 87637 SARSCOV2&INF A&B&RSV AMP PRB: CPT

## 2024-10-12 PROCEDURE — 94799 UNLISTED PULMONARY SVC/PX: CPT

## 2024-10-12 PROCEDURE — 80053 COMPREHEN METABOLIC PANEL: CPT

## 2024-10-12 PROCEDURE — 25010000002 ONDANSETRON PER 1 MG

## 2024-10-12 PROCEDURE — 94640 AIRWAY INHALATION TREATMENT: CPT

## 2024-10-12 PROCEDURE — 96376 TX/PRO/DX INJ SAME DRUG ADON: CPT

## 2024-10-12 RX ORDER — ONDANSETRON 4 MG/1
8 TABLET, ORALLY DISINTEGRATING ORAL EVERY 8 HOURS PRN
Qty: 15 TABLET | Refills: 0 | Status: SHIPPED | OUTPATIENT
Start: 2024-10-12 | End: 2024-10-17

## 2024-10-12 RX ORDER — IPRATROPIUM BROMIDE AND ALBUTEROL SULFATE 2.5; .5 MG/3ML; MG/3ML
3 SOLUTION RESPIRATORY (INHALATION) ONCE
Status: COMPLETED | OUTPATIENT
Start: 2024-10-12 | End: 2024-10-12

## 2024-10-12 RX ORDER — ALBUTEROL SULFATE 90 UG/1
2 INHALANT RESPIRATORY (INHALATION) EVERY 4 HOURS PRN
Qty: 1 G | Refills: 0 | Status: SHIPPED | OUTPATIENT
Start: 2024-10-12 | End: 2027-04-26

## 2024-10-12 RX ORDER — ACETAMINOPHEN 325 MG/1
650 TABLET ORAL ONCE
Status: COMPLETED | OUTPATIENT
Start: 2024-10-12 | End: 2024-10-12

## 2024-10-12 RX ORDER — SODIUM CHLORIDE 0.9 % (FLUSH) 0.9 %
10 SYRINGE (ML) INJECTION AS NEEDED
Status: DISCONTINUED | OUTPATIENT
Start: 2024-10-12 | End: 2024-10-13 | Stop reason: HOSPADM

## 2024-10-12 RX ORDER — FAMOTIDINE 20 MG/1
20 TABLET, FILM COATED ORAL 2 TIMES DAILY
Qty: 30 TABLET | Refills: 0 | Status: SHIPPED | OUTPATIENT
Start: 2024-10-12 | End: 2024-10-27

## 2024-10-12 RX ORDER — FAMOTIDINE 10 MG/ML
20 INJECTION, SOLUTION INTRAVENOUS ONCE
Status: COMPLETED | OUTPATIENT
Start: 2024-10-12 | End: 2024-10-12

## 2024-10-12 RX ORDER — ONDANSETRON 2 MG/ML
4 INJECTION INTRAMUSCULAR; INTRAVENOUS ONCE
Status: COMPLETED | OUTPATIENT
Start: 2024-10-12 | End: 2024-10-12

## 2024-10-12 RX ORDER — PREDNISONE 50 MG/1
50 TABLET ORAL DAILY
Qty: 5 TABLET | Refills: 0 | Status: SHIPPED | OUTPATIENT
Start: 2024-10-12 | End: 2024-10-17

## 2024-10-12 RX ORDER — METHYLPREDNISOLONE SODIUM SUCCINATE 125 MG/2ML
125 INJECTION, POWDER, LYOPHILIZED, FOR SOLUTION INTRAMUSCULAR; INTRAVENOUS ONCE
Status: COMPLETED | OUTPATIENT
Start: 2024-10-12 | End: 2024-10-12

## 2024-10-12 RX ADMIN — ONDANSETRON 4 MG: 2 INJECTION INTRAMUSCULAR; INTRAVENOUS at 21:56

## 2024-10-12 RX ADMIN — ONDANSETRON 4 MG: 2 INJECTION INTRAMUSCULAR; INTRAVENOUS at 17:18

## 2024-10-12 RX ADMIN — FAMOTIDINE 20 MG: 10 INJECTION INTRAVENOUS at 21:55

## 2024-10-12 RX ADMIN — ACETAMINOPHEN 650 MG: 325 TABLET ORAL at 22:19

## 2024-10-12 RX ADMIN — METHYLPREDNISOLONE SODIUM SUCCINATE 125 MG: 125 INJECTION, POWDER, FOR SOLUTION INTRAMUSCULAR; INTRAVENOUS at 21:55

## 2024-10-12 RX ADMIN — IPRATROPIUM BROMIDE AND ALBUTEROL SULFATE 3 ML: .5; 3 SOLUTION RESPIRATORY (INHALATION) at 21:41

## 2024-10-12 NOTE — ED PROVIDER NOTES
Time: 5:15 PM EDT  Date of encounter:  10/12/2024  Independent Historian/Clinical History and Information was obtained by:   Patient    History is limited by: N/A    Chief Complaint   Patient presents with    Weakness - Generalized         History of Present Illness:  Patient is a 74 y.o. year old female who presents to the emergency department for evaluation of weakness.  Patient states that she was diagnosed with COVID 3 weeks ago and since that time she has been having an increase in nausea, weakness, cough.  The patient states that 3 weeks ago she had classic COVID symptoms.  She states that she was at work and developed muscle aches, chills headache cough.  She took a home COVID test and was positive.  The patient states that she did not feel real well for a week.  She actually saw her primary care physician during that week and was diagnosed with a sinusitis that she had pressure over the left maxillary sinus and she is placed on doxycycline.  She states since that time she really has not felt very well.  She notes anorexia and nausea.  She has had no abdominal pain.  She states intermittently she will have a slight headache.  She has a slight cough.  She denies any shortness of breath or chest pain.  She denies any abdominal pain.  She has had no vomiting.  She has had no diarrhea.  She denies any urinary frequency urgency or dysuria.  She has had no documented fever.  She states that she just overall does not feel very well and feels overall weak and notes decreased appetite      Patient Care Team  Primary Care Provider: Jude Forrester MD    Past Medical History:     Allergies   Allergen Reactions    Cephalexin Nausea And Vomiting, GI Intolerance and Other (See Comments)     Other reaction(s): GI Intolerance, Other (see comments)    Neomycin-Bacitracin Zn-Polymyx Other (See Comments)    Penicillins Other (See Comments)     MOTHER & DAUGHTER HAD SEVERE REACTION.  DAUGHTER HAD EXTREME SWELLING AND  TROUBLE BREATHING    Pramoxine-Benzalkonium Cl Other (See Comments)     Other reaction(s): Other (see comments)    Sulfamethoxazole-Trimethoprim Other (See Comments)     Other reaction(s): Other (see comments)    Neosporin [Neomycin-Polymyxin-Gramicidin] Rash     Past Medical History:   Diagnosis Date    Anxiety     Bipolar affect, depressed     Cancer     Breast    Chronic constipation     Colon polyp     Last colonoscopy - Dr Leiva    GERD (gastroesophageal reflux disease)     H/O complete eye exam 01/01/2017    History of breast cancer 2011    History of gastric ulcer     Hyperlipidemia     Hypertension     Hypothyroidism     HYPOTHYROIDISM    Irritable bowel syndrome     Prediabetes     NO MEDS    Sleep apnea     NO MACHINE, WEARS MOUTH NIGHTGUARD    Upper abdominal pain      Past Surgical History:   Procedure Laterality Date    APPENDECTOMY  1968    BREAST SURGERY      COLONOSCOPY  11/2015    COLONOSCOPY N/A 03/19/2021    Procedure: COLONOSCOPY TO CECUM AND TI WITH COLD SNARE POLYPECTOMY;  Surgeon: Sushil Leiva MD;  Location: [facility] ENDOSCOPY;  Service: Gastroenterology;  Laterality: N/A;  PRE- FAMILY HX COLON CANCER, PERSONAL HX COLON POLYPS  POST- POLYP, DIVERTICULOSIS    EYE SURGERY      HYSTERECTOMY      MASTECTOMY Bilateral 2012    double     Family History   Problem Relation Age of Onset    Colon cancer Father 60    Alcohol abuse Father     Breast cancer Sister 39    Malig Hyperthermia Neg Hx        Home Medications:  Prior to Admission medications    Medication Sig Start Date End Date Taking? Authorizing Provider   ALPRAZolam (XANAX) 0.5 MG tablet Take 1 tablet by mouth As Needed for Anxiety. 10/15/21   Bryce Soto MD   ARIPiprazole (ABILIFY) 2 MG tablet Take 1 tablet by mouth Daily. 2/17/16   Provider, MD Dolly   hydroCHLOROthiazide (HYDRODIURIL) 25 MG tablet Take 1 tablet by mouth Daily. 11/7/22   Bryce Soto MD   lamoTRIgine (LaMICtal) 200 MG tablet Take 1 tablet by mouth Daily.  3/10/16   Dolly Haywood MD   levothyroxine (SYNTHROID, LEVOTHROID) 88 MCG tablet Take 1 tablet by mouth Daily. 11/8/22   Bryce Soto MD   losartan (COZAAR) 50 MG tablet TAKE 1 TABLET BY MOUTH DAILY 6/30/23   Bryce Soto MD   omeprazole OTC (PriLOSEC OTC) 20 MG EC tablet Prilosec OTC 20 mg oral tablet,delayed release (DR/EC) take 1 tablet by oral route daily   Active    Dolly Haywood MD   venlafaxine XR (EFFEXOR-XR) 75 MG 24 hr capsule Take 1 capsule by mouth 3 (Three) Times a Day. 2/17/16   Dolly Haywood MD   ezetimibe (ZETIA) 10 MG tablet Take 1 tablet by mouth Every Evening. 4/2/24 10/12/24  Dolly Haywood MD   fenofibrate micronized (ANTARA) 130 MG capsule TAKE 1 capsule BY MOUTH EVERY MORNING BEFORE BREAKFAST 6/30/23 10/12/24  Bryce Soto MD   loteprednol (LOTEMAX) 0.5 % ophthalmic suspension INSTILL 1 DROP IN EACH EYE TWICE DAILY FOR ALLERGIES 3/22/24 10/12/24  Dolly Haywood MD   traZODone (DESYREL) 100 MG tablet Take 1 tablet by mouth Every Evening. 1/10/24 10/12/24  Dolly Haywood MD   Trulance 3 MG tablet  4/23/24 10/12/24  Dolly Haywood MD        Social History:   Social History     Tobacco Use    Smoking status: Every Day     Current packs/day: 0.50     Average packs/day: 0.5 packs/day for 50.4 years (25.2 ttl pk-yrs)     Types: Cigarettes     Start date: 1/1/1980    Smokeless tobacco: Never   Vaping Use    Vaping status: Never Used   Substance Use Topics    Alcohol use: Never    Drug use: Never         Review of Systems:  Review of Systems   Constitutional:  Positive for appetite change and fatigue. Negative for chills, diaphoresis and fever.   HENT:  Negative for congestion, postnasal drip, rhinorrhea and sore throat.    Eyes:  Negative for photophobia.   Respiratory:  Positive for cough. Negative for chest tightness and shortness of breath.    Cardiovascular:  Negative for chest pain, palpitations and leg swelling.   Gastrointestinal:   "Positive for nausea. Negative for abdominal pain, diarrhea and vomiting.   Genitourinary:  Negative for difficulty urinating, dysuria, flank pain, frequency, hematuria and urgency.   Musculoskeletal:  Negative for neck pain and neck stiffness.   Skin:  Negative for pallor and rash.   Neurological:  Positive for weakness and headaches. Negative for dizziness, syncope and numbness.   Hematological:  Negative for adenopathy. Does not bruise/bleed easily.   Psychiatric/Behavioral: Negative.          Physical Exam:  /74   Pulse 88   Temp 98.1 °F (36.7 °C)   Resp 20   Ht 160 cm (63\")   Wt 67.9 kg (149 lb 11.1 oz)   LMP  (LMP Unknown)   SpO2 90%   BMI 26.52 kg/m²         Physical Exam  Vitals and nursing note reviewed.   Constitutional:       General: She is not in acute distress.     Appearance: Normal appearance. She is not ill-appearing, toxic-appearing or diaphoretic.   HENT:      Head: Normocephalic and atraumatic.      Mouth/Throat:      Mouth: Mucous membranes are moist.      Pharynx: No oropharyngeal exudate or posterior oropharyngeal erythema.   Eyes:      Pupils: Pupils are equal, round, and reactive to light.   Cardiovascular:      Rate and Rhythm: Normal rate and regular rhythm.      Pulses: Normal pulses.           Carotid pulses are 2+ on the right side and 2+ on the left side.       Radial pulses are 2+ on the right side and 2+ on the left side.        Femoral pulses are 2+ on the right side and 2+ on the left side.       Popliteal pulses are 2+ on the right side and 2+ on the left side.        Dorsalis pedis pulses are 2+ on the right side and 2+ on the left side.        Posterior tibial pulses are 2+ on the right side and 2+ on the left side.      Heart sounds: Normal heart sounds. No murmur heard.  Pulmonary:      Effort: Pulmonary effort is normal. No accessory muscle usage, respiratory distress or retractions.      Breath sounds: Examination of the right-upper field reveals decreased " breath sounds and wheezing. Examination of the left-upper field reveals decreased breath sounds and wheezing. Examination of the right-middle field reveals decreased breath sounds and wheezing. Examination of the left-middle field reveals decreased breath sounds and wheezing. Examination of the right-lower field reveals decreased breath sounds and wheezing. Examination of the left-lower field reveals decreased breath sounds and wheezing. Decreased breath sounds and wheezing present. No rhonchi or rales.   Abdominal:      General: Abdomen is flat. There is no distension.      Palpations: Abdomen is soft. There is no mass or pulsatile mass.      Tenderness: There is no abdominal tenderness. There is no right CVA tenderness, left CVA tenderness, guarding or rebound.      Comments: No rigidity   Musculoskeletal:         General: No swelling, tenderness or deformity.      Cervical back: Neck supple. No rigidity or tenderness. No spinous process tenderness or muscular tenderness. Normal range of motion.      Right lower leg: No edema.      Left lower leg: No edema.   Skin:     General: Skin is warm and dry.      Capillary Refill: Capillary refill takes less than 2 seconds.      Coloration: Skin is not jaundiced or pale.      Findings: No erythema.   Neurological:      General: No focal deficit present.      Mental Status: She is alert and oriented to person, place, and time. Mental status is at baseline.      Cranial Nerves: Cranial nerves 2-12 are intact. No cranial nerve deficit.      Sensory: Sensation is intact. No sensory deficit.      Motor: Motor function is intact. No weakness or pronator drift.      Coordination: Coordination is intact. Coordination normal.   Psychiatric:         Mood and Affect: Mood normal.         Behavior: Behavior normal.                  Procedures:  Procedures      Medical Decision Making:      Comorbidities that affect care:    Anxiety, hypothyroid, hyperlipidemia, hypertension, GERD,  current smoker    External Notes reviewed:    None      The following orders were placed and all results were independently analyzed by me:  Orders Placed This Encounter   Procedures    COVID-19, FLU A/B, RSV PCR 1 HR TAT - Swab, Nasopharynx    XR Chest 1 View    [place] Draw    Comprehensive Metabolic Panel    Single High Sensitivity Troponin T    Magnesium    Urinalysis With Microscopic If Indicated (No Culture) - Urine, Clean Catch    CBC Auto Differential    TSH Rfx On Abnormal To Free T4    Urinalysis, Microscopic Only - Urine, Clean Catch    T4, Free    Undress & Gown    Continuous Pulse Oximetry    Vital Signs    Orthostatic Blood Pressure    Please p.o. challenge the patient  Nursing Communication    POC Glucose Once    ECG 12 Lead ED Triage Standing Order; Weak / Dizzy / AMS    CBC & Differential    Green Top (Gel)    Lavender Top    Gold Top - SST    Light Blue Top       Medications Given in the Emergency Department:  Medications   ondansetron (ZOFRAN) injection 4 mg (4 mg Intravenous Given 10/12/24 1718)   famotidine (PEPCID) injection 20 mg (20 mg Intravenous Given 10/12/24 2155)   ondansetron (ZOFRAN) injection 4 mg (4 mg Intravenous Given 10/12/24 2156)   ipratropium-albuterol (DUO-NEB) nebulizer solution 3 mL (3 mL Nebulization Given 10/12/24 2141)   methylPREDNISolone sodium succinate (SOLU-Medrol) injection 125 mg (125 mg Intravenous Given 10/12/24 2155)   acetaminophen (TYLENOL) tablet 650 mg (650 mg Oral Given 10/12/24 2219)        ED Course:    The patient was initially evaluated in the triage area where orders were placed. The patient was later dispositioned by Giacomo Siddiqi DO.      The patient was advised to stay for completion of workup which includes but is not limited to communication of labs and radiological results, reassessment and plan. The patient was advised that leaving prior to disposition by a provider could result in critical findings that are not communicated to the patient.      ED Course as of 10/14/24 0342   Sat Oct 12, 2024   8117 PROVIDER IN TRIAGE  Patient was evaluated by me in triage, Bailey Seaver, APRN, [place].  Orders were placed and patient is currently awaiting final results and disposition.   [AS]   1829 EKG:    Rhythm: Sinus rhythm  Rate: 93  Intervals: Normal MA and QT interval  Right bundle branch block present  T-wave: Isolated nonspecific T wave inversion in V2  ST Segment: Nonspecific ST segment V3, III, no obvious pathological ST elevation or reciprocal ST depression to suggest STEMI    EKG Comparison: No EKG available for comparison    Interpreted by me   [SD]      ED Course User Index  [AS] Seaver, Alyce B, APRN  [SD] Giacomo Siddiqi DO       Labs:    Lab Results (last 24 hours)       ** No results found for the last 24 hours. **             Imaging:    No Radiology Exams Resulted Within Past 24 Hours      Differential Diagnosis and Discussion:      Weakness: Based on the patient's history, signs, and symptoms, the diffential diagnosis includes but is not limited to meningitis, stroke, sepsis, subarachnoid hemorrhage, intracranial bleeding, encephalitis, acute uti, dehydration, MS, myasthenia gravis, Guillan Knippa, migraine variant, neuromuscular disorders vertigo, electrolyte imbalance, and metabolic disorders.    All labs were reviewed and interpreted by me.  All X-rays impressions were independently interpreted by me.  EKG was interpreted by me.    MDM  Number of Diagnoses or Management Options  Bronchitis  COVID-19  Nausea  Weakness  Diagnosis management comments:   The patient's CBC was reviewed and shows no abnormalities of critical concern.  Of note, there is no anemia requiring a blood transfusion and the platelet count is acceptable    The patient's CMP was reviewed and shows no abnormalities of critical concern.  Of note, the patient's sodium and potassium are acceptable.  The patient's liver enzymes are unremarkable.  The patient's renal function including  creatinine is preserved.  The patient has a normal anion gap.    The patient's EKG demonstrated a normal sinus rhythm.  The EKG demonstrated no evidence of dysrhythmia.  The patient had no acute ST abnormalities or acute T wave abnormalities to indicate STEMI or other acute cardiac pathology, injury or ischemia    Patient's free T4 was 1.32.  Patient's TSH is 8.160.  I do not feel the patient has myxedema coma or thyrotoxic.    Patient's COVID swab was positive.    The patient's urinalysis is contaminated.  Do not feel the patient has a UTI.        The patient was given Zofran Pepcid for nausea and anorexia.  The patient was reassessed.  The patient states that she feels better.  The patient's nausea is resolved.  The patient is tolerating p.o. fluids out difficulty.  I discussed the importance of pushing fluids and advancing her diet as tolerated.  The patient subjectively denies any shortness of breath.  Her pulse ox was the low end of normal on the monitor at 90%.  The patient was in no respiratory distress including no tachypnea, accessory muscle use or intercostal retractions.  She did have diffuse wheezing and is a long-term smoker.  The patient was given Solu-Medrol and a DuoNeb.  The patient was reassessed.  The patient again is in no respiratory distress including no tachypnea, accessory muscle use or intercostal retractions.  When the patient does have a good waveform, her pulse ox is 95%.  I will place the patient on albuterol and prednisone.  The patient states that she feels better and she feels comfortable to go home.  The patient already has an appointment with her primary care physician on Monday.  She will keep that appointment.  She will have her primary care provider recheck her pulse ox.  They will discuss the need for pulmonology referral and pulmonary function test.  I have advised and discussed the patient's need to quit smoking.    The patient was given very specific instructions on when and  why to return to the emergency room.  The patient voiced understanding and felt comfortable with the discharge instructions.  They would return to the emergency room if necessary.  The patient appears appropriate for discharge and outpatient follow-up.         Amount and/or Complexity of Data Reviewed  Clinical lab tests: reviewed  Tests in the radiology section of CPT®: reviewed  Tests in the medicine section of CPT®: reviewed  Decide to obtain previous medical records or to obtain history from someone other than the patient: yes           Social Determinants of Health:    Patient is independent, reliable, and has access to care.       Disposition and Care Coordination:    Discharged: The patient is suitable and stable for discharge with no need for consideration of admission.    I have explained discharge medications and the need for follow up with the patient/caretakers. This was also printed in the discharge instructions. Patient was discharged with the following medications and follow up:      Medication List        New Prescriptions      albuterol sulfate  (90 Base) MCG/ACT inhaler  Commonly known as: PROVENTIL HFA;VENTOLIN HFA;PROAIR HFA  Inhale 2 puffs Every 4 (Four) Hours As Needed for Wheezing.     famotidine 20 MG tablet  Commonly known as: PEPCID  Take 1 tablet by mouth 2 (Two) Times a Day for 15 days.     ondansetron ODT 4 MG disintegrating tablet  Commonly known as: ZOFRAN-ODT  Place 2 tablets on the tongue Every 8 (Eight) Hours As Needed for Nausea or Vomiting for up to 5 days.     predniSONE 50 MG tablet  Commonly known as: DELTASONE  Take 1 tablet by mouth Daily for 5 days.               Where to Get Your Medications        These medications were sent to [facility]  [facility]  [place], [place] - [address] [facility] [address] - [phone] [facility] [phone]   [address] [address]      Phone: [phone]   albuterol sulfate  (90 Base)  MCG/ACT inhaler  famotidine 20 MG tablet  ondansetron ODT 4 MG disintegrating tablet  predniSONE 50 MG tablet      Jude Forrester MD  [address] [place], [place] [id number] [phone]    On 10/14/2024  As already scheduled       Final diagnoses:   COVID-19   Nausea   Bronchitis   Weakness        ED Disposition       ED Disposition   Discharge    Condition   Stable    Comment   --               This medical record created using voice recognition software.             Giacomo Siddiqi DO  10/14/24 5296

## 2024-10-13 NOTE — DISCHARGE INSTRUCTIONS
Please keep your appointment with your primary care physician on Monday as already scheduled    Please push fluids and advance your diet as tolerated    Please discuss possible need for pulmonology referral and pulmonary function test with your primary care physician    Please stop smoking    Please return to emergency room for shortness of breath, chest pain, chest pressure, tractable headache, vomiting, fever, altered mental status, near passing out, passing out or any new symptoms you are concerned with

## 2024-10-28 LAB
QT INTERVAL: 368 MS
QTC INTERVAL: 459 MS

## (undated) DEVICE — LN SMPL CO2 SHTRM SD STREAM W/M LUER

## (undated) DEVICE — SENSR O2 OXIMAX FNGR A/ 18IN NONSTR

## (undated) DEVICE — THE SINGLE USE ETRAP – POLYP TRAP IS USED FOR SUCTION RETRIEVAL OF ENDOSCOPICALLY REMOVED POLYPS.: Brand: ETRAP

## (undated) DEVICE — KT ORCA ORCAPOD DISP STRL

## (undated) DEVICE — THE TORRENT IRRIGATION SCOPE CONNECTOR IS USED WITH THE TORRENT IRRIGATION TUBING TO PROVIDE IRRIGATION FLUIDS SUCH AS STERILE WATER DURING GASTROINTESTINAL ENDOSCOPIC PROCEDURES WHEN USED IN CONJUNCTION WITH AN IRRIGATION PUMP (OR ELECTROSURGICAL UNIT).: Brand: TORRENT

## (undated) DEVICE — SNAR POLYP CAPTIVATOR RND STFF 2.4 240CM 10MM 1P/U

## (undated) DEVICE — ADAPT CLN BIOGUARD AIR/H2O DISP

## (undated) DEVICE — TUBING, SUCTION, 1/4" X 10', STRAIGHT: Brand: MEDLINE

## (undated) DEVICE — CANN O2 ETCO2 FITS ALL CONN CO2 SMPL A/ 7IN DISP LF